# Patient Record
Sex: MALE | Race: WHITE | NOT HISPANIC OR LATINO | Employment: FULL TIME | URBAN - METROPOLITAN AREA
[De-identification: names, ages, dates, MRNs, and addresses within clinical notes are randomized per-mention and may not be internally consistent; named-entity substitution may affect disease eponyms.]

---

## 2018-01-08 ENCOUNTER — OFFICE VISIT (OUTPATIENT)
Dept: URGENT CARE | Facility: CLINIC | Age: 58
End: 2018-01-08
Payer: COMMERCIAL

## 2018-01-08 VITALS
BODY MASS INDEX: 31.39 KG/M2 | HEART RATE: 97 BPM | DIASTOLIC BLOOD PRESSURE: 82 MMHG | TEMPERATURE: 98 F | WEIGHT: 200 LBS | SYSTOLIC BLOOD PRESSURE: 141 MMHG | RESPIRATION RATE: 18 BRPM | OXYGEN SATURATION: 97 % | HEIGHT: 67 IN

## 2018-01-08 DIAGNOSIS — J06.9 VIRAL URI WITH COUGH: ICD-10-CM

## 2018-01-08 DIAGNOSIS — R50.9 FEVER AND CHILLS: Primary | ICD-10-CM

## 2018-01-08 LAB
CTP QC/QA: YES
FLUAV AG NPH QL: NEGATIVE
FLUBV AG NPH QL: NEGATIVE

## 2018-01-08 PROCEDURE — 99203 OFFICE O/P NEW LOW 30 MIN: CPT | Mod: S$GLB,,, | Performed by: NURSE PRACTITIONER

## 2018-01-08 PROCEDURE — 87804 INFLUENZA ASSAY W/OPTIC: CPT | Mod: QW,S$GLB,, | Performed by: NURSE PRACTITIONER

## 2018-01-08 RX ORDER — PROMETHAZINE HYDROCHLORIDE AND DEXTROMETHORPHAN HYDROBROMIDE 6.25; 15 MG/5ML; MG/5ML
5 SYRUP ORAL EVERY 4 HOURS PRN
Qty: 120 ML | Refills: 0 | Status: SHIPPED | OUTPATIENT
Start: 2018-01-08 | End: 2018-01-18

## 2018-01-08 RX ORDER — FLUTICASONE PROPIONATE 50 MCG
1 SPRAY, SUSPENSION (ML) NASAL DAILY
Qty: 1 BOTTLE | Refills: 0 | Status: SHIPPED | OUTPATIENT
Start: 2018-01-08 | End: 2018-08-28

## 2018-01-08 NOTE — PATIENT INSTRUCTIONS
Please take Mucinex with plenty of water.  Viral Upper Respiratory Illness (Adult)  You have a viral upper respiratory illness (URI), which is another term for the common cold. This illness is contagious during the first few days. It is spread through the air by coughing and sneezing. It may also be spread by direct contact (touching the sick person and then touching your own eyes, nose, or mouth). Frequent handwashing will decrease risk of spread. Most viral illnesses go away within 7 to 10 days with rest and simple home remedies. Sometimes the illness may last for several weeks. Antibiotics will not kill a virus, and they are generally not prescribed for this condition.    Home care  · If symptoms are severe, rest at home for the first 2 to 3 days. When you resume activity, don't let yourself get too tired.  · Avoid being exposed to cigarette smoke (yours or others).  · You may use acetaminophen or ibuprofen to control pain and fever, unless another medicine was prescribed. (Note: If you have chronic liver or kidney disease, have ever had a stomach ulcer or gastrointestinal bleeding, or are taking blood-thinning medicines, talk with your healthcare provider before using these medicines.) Aspirin should never be given to anyone under 18 years of age who is ill with a viral infection or fever. It may cause severe liver or brain damage.  · Your appetite may be poor, so a light diet is fine. Avoid dehydration by drinking 6 to 8 glasses of fluids per day (water, soft drinks, juices, tea, or soup). Extra fluids will help loosen secretions in the nose and lungs.  · Over-the-counter cold medicines will not shorten the length of time youre sick, but they may be helpful for the following symptoms: cough, sore throat, and nasal and sinus congestion. (Note: Do not use decongestants if you have high blood pressure.)  Follow-up care  Follow up with your healthcare provider, or as advised.  When to seek medical  advice  Call your healthcare provider right away if any of these occur:  · Cough with lots of colored sputum (mucus)  · Severe headache; face, neck, or ear pain  · Difficulty swallowing due to throat pain  · Fever of 100.4°F (38°C)  Call 911, or get immediate medical care  Call emergency services right away if any of these occur:  · Chest pain, shortness of breath, wheezing, or difficulty breathing  · Coughing up blood  · Inability to swallow due to throat pain  Date Last Reviewed: 9/13/2015 © 2000-2017 [employer]. [address] [place] [address]. All rights reserved. This information is not intended as a substitute for professional medical care. Always follow your healthcare professional's instructions.

## 2018-01-08 NOTE — PROGRESS NOTES
"Subjective:       Patient ID: Chi Alvarado Jr. is a 57 y.o. male.    Vitals:  height is 5' 7" (1.702 m) and weight is 90.7 kg (200 lb). His temperature is 98.1 °F (36.7 °C). His blood pressure is 141/82 (abnormal) and his pulse is 97. His respiration is 18 and oxygen saturation is 97%.     Chief Complaint: URI    States his symptoms started on Friday. Fever has been subjective. Taking Mucinex, AlkaSeltzer cold.      URI    This is a new problem. The current episode started in the past 7 days. The problem has been gradually worsening. There has been no fever. Associated symptoms include congestion, coughing, headaches and rhinorrhea. Pertinent negatives include no abdominal pain, chest pain, ear pain, nausea, rash, sinus pain, sneezing, sore throat or wheezing. Treatments tried: mucinex. The treatment provided no relief.     Review of Systems   Constitution: Positive for chills, fever and malaise/fatigue.   HENT: Positive for congestion and rhinorrhea. Negative for ear pain, hoarse voice, sinus pain, sneezing and sore throat.    Eyes: Negative for discharge and redness.   Cardiovascular: Negative for chest pain, dyspnea on exertion and leg swelling.   Respiratory: Positive for cough. Negative for shortness of breath, sputum production and wheezing.    Hematologic/Lymphatic: Negative for adenopathy.   Skin: Negative for color change and rash.   Musculoskeletal: Negative for myalgias.   Gastrointestinal: Negative for abdominal pain and nausea.   Neurological: Positive for headaches. Negative for dizziness and light-headedness.   All other systems reviewed and are negative.      Objective:      Physical Exam   Constitutional: He is oriented to person, place, and time. He appears well-developed and well-nourished.  Non-toxic appearance. He does not have a sickly appearance. He does not appear ill.   HENT:   Head: Normocephalic and atraumatic.   Right Ear: Hearing, external ear and ear canal normal. A middle ear " effusion is present.   Left Ear: Hearing, external ear and ear canal normal. A middle ear effusion is present.   Nose: Mucosal edema and rhinorrhea present. Right sinus exhibits no maxillary sinus tenderness and no frontal sinus tenderness. Left sinus exhibits no maxillary sinus tenderness and no frontal sinus tenderness.   Mouth/Throat: Uvula is midline and mucous membranes are normal. Oropharyngeal exudate and posterior oropharyngeal erythema present.   Eyes: Conjunctivae and EOM are normal. Pupils are equal, round, and reactive to light.   Neck: Normal range of motion. Neck supple. No spinous process tenderness and no muscular tenderness present. No neck rigidity. Normal range of motion present.   Cardiovascular: Normal rate, regular rhythm and normal heart sounds.    Pulmonary/Chest: Effort normal and breath sounds normal. He has no wheezes.   Lymphadenopathy:     He has no cervical adenopathy.   Neurological: He is alert and oriented to person, place, and time.   Skin: Skin is warm and dry. Capillary refill takes less than 2 seconds. No rash noted.   Psychiatric: He has a normal mood and affect.   Nursing note and vitals reviewed.      Assessment:       1. Fever and chills    2. Viral URI with cough        Plan:         Fever and chills  -     POCT Influenza A/B    Viral URI with cough  -     fluticasone (FLONASE) 50 mcg/actuation nasal spray; 1 spray (50 mcg total) by Each Nare route once daily.  Dispense: 1 Bottle; Refill: 0  -     promethazine-dextromethorphan (PROMETHAZINE-DM) 6.25-15 mg/5 mL Syrp; Take 5 mLs by mouth every 4 (four) hours as needed (cough). CAUTION: MAY CAUSE DROWSINESS  Dispense: 120 mL; Refill: 0      Patient Instructions       Please take Mucinex with plenty of water.  Viral Upper Respiratory Illness (Adult)  You have a viral upper respiratory illness (URI), which is another term for the common cold. This illness is contagious during the first few days. It is spread through the air by  coughing and sneezing. It may also be spread by direct contact (touching the sick person and then touching your own eyes, nose, or mouth). Frequent handwashing will decrease risk of spread. Most viral illnesses go away within 7 to 10 days with rest and simple home remedies. Sometimes the illness may last for several weeks. Antibiotics will not kill a virus, and they are generally not prescribed for this condition.    Home care  · If symptoms are severe, rest at home for the first 2 to 3 days. When you resume activity, don't let yourself get too tired.  · Avoid being exposed to cigarette smoke (yours or others).  · You may use acetaminophen or ibuprofen to control pain and fever, unless another medicine was prescribed. (Note: If you have chronic liver or kidney disease, have ever had a stomach ulcer or gastrointestinal bleeding, or are taking blood-thinning medicines, talk with your healthcare provider before using these medicines.) Aspirin should never be given to anyone under 18 years of age who is ill with a viral infection or fever. It may cause severe liver or brain damage.  · Your appetite may be poor, so a light diet is fine. Avoid dehydration by drinking 6 to 8 glasses of fluids per day (water, soft drinks, juices, tea, or soup). Extra fluids will help loosen secretions in the nose and lungs.  · Over-the-counter cold medicines will not shorten the length of time youre sick, but they may be helpful for the following symptoms: cough, sore throat, and nasal and sinus congestion. (Note: Do not use decongestants if you have high blood pressure.)  Follow-up care  Follow up with your healthcare provider, or as advised.  When to seek medical advice  Call your healthcare provider right away if any of these occur:  · Cough with lots of colored sputum (mucus)  · Severe headache; face, neck, or ear pain  · Difficulty swallowing due to throat pain  · Fever of 100.4°F (38°C)  Call 911, or get immediate medical care  Call  emergency services right away if any of these occur:  · Chest pain, shortness of breath, wheezing, or difficulty breathing  · Coughing up blood  · Inability to swallow due to throat pain  Date Last Reviewed: 9/13/2015  © 0948-8578 [employer]. [address] [place] [address], [place] [address]. All rights reserved. This information is not intended as a substitute for professional medical care. Always follow your healthcare professional's instructions.

## 2018-08-28 ENCOUNTER — OFFICE VISIT (OUTPATIENT)
Dept: FAMILY MEDICINE | Facility: CLINIC | Age: 58
End: 2018-08-28
Payer: COMMERCIAL

## 2018-08-28 ENCOUNTER — DOCUMENTATION ONLY (OUTPATIENT)
Dept: FAMILY MEDICINE | Facility: CLINIC | Age: 58
End: 2018-08-28

## 2018-08-28 VITALS
TEMPERATURE: 98 F | OXYGEN SATURATION: 96 % | BODY MASS INDEX: 29.98 KG/M2 | DIASTOLIC BLOOD PRESSURE: 68 MMHG | WEIGHT: 191 LBS | SYSTOLIC BLOOD PRESSURE: 130 MMHG | HEIGHT: 67 IN | HEART RATE: 86 BPM

## 2018-08-28 DIAGNOSIS — G47.9 SLEEP DISTURBANCE: ICD-10-CM

## 2018-08-28 DIAGNOSIS — F17.200 TOBACCO DEPENDENCE: ICD-10-CM

## 2018-08-28 DIAGNOSIS — Z78.9 ALCOHOL CONSUMPTION HEAVY: Chronic | ICD-10-CM

## 2018-08-28 DIAGNOSIS — Z00.00 ROUTINE MEDICAL EXAM: Primary | ICD-10-CM

## 2018-08-28 DIAGNOSIS — I83.93 VARICOSE VEINS OF BOTH LOWER EXTREMITIES WITHOUT ULCER OR INFLAMMATION: ICD-10-CM

## 2018-08-28 DIAGNOSIS — Z12.11 ENCOUNTER FOR FIT (FECAL IMMUNOCHEMICAL TEST) SCREENING: ICD-10-CM

## 2018-08-28 DIAGNOSIS — B18.2 CHRONIC HEPATITIS C WITHOUT HEPATIC COMA: ICD-10-CM

## 2018-08-28 DIAGNOSIS — R53.83 FATIGUE, UNSPECIFIED TYPE: ICD-10-CM

## 2018-08-28 DIAGNOSIS — Z23 NEED FOR DIPHTHERIA, TETANUS, PERTUSSIS, AND HIB VACCINATION: ICD-10-CM

## 2018-08-28 PROCEDURE — 90471 IMMUNIZATION ADMIN: CPT | Mod: S$GLB,,, | Performed by: INTERNAL MEDICINE

## 2018-08-28 PROCEDURE — 90715 TDAP VACCINE 7 YRS/> IM: CPT | Mod: S$GLB,,, | Performed by: INTERNAL MEDICINE

## 2018-08-28 PROCEDURE — 99386 PREV VISIT NEW AGE 40-64: CPT | Mod: 25,S$GLB,, | Performed by: INTERNAL MEDICINE

## 2018-08-28 PROCEDURE — 99999 PR PBB SHADOW E&M-EST. PATIENT-LVL III: CPT | Mod: PBBFAC,,, | Performed by: INTERNAL MEDICINE

## 2018-08-28 NOTE — PROGRESS NOTES
Assessment & Plan  Problem List Items Addressed This Visit        Psychiatric    Alcohol consumption heavy (Chronic)    Overview     Reduced to only on weekends         Current Assessment & Plan     Counseled on reducing intake further.             Cardiac/Vascular    Varicose veins of both lower extremities without ulcer or inflammation    Current Assessment & Plan     Asymptomatic.  Counseled on compression, sodium restriction, elevation.              GI    Hepatitis C without hepatic coma (Chronic)    Current Assessment & Plan     Recheck labs.  Referred back to Hep C clinic         Relevant Orders    AFP tumor marker    Hepatitis C RNA, quantitative, PCR    AMB REFERRAL TO HEPATITIS C       Other    Sleep disturbance    Current Assessment & Plan     Some restless leg symptoms.  Checking labs today.          Tobacco dependence (Chronic)    Current Assessment & Plan     I counseled the patient on smoking cessation, risks associated with smoking, having a quit date, nicotine replacement, medications that can aid in cessation, and having a plan for future cravings.  The patient stated that he is not currently ready to quit.  Seems pre-contemlplative             Other Visit Diagnoses     Routine medical exam    -  Primary  -    Discussed healthy diet, regular exercise, necessary labs, age appropriate cancer screening, and routine vaccinations.       Relevant Orders    CBC auto differential    Comprehensive metabolic panel    Lipid panel    TSH    Hemoglobin A1c    Need for diphtheria, tetanus, pertussis, and Hib vaccination    -  vaccinate    Relevant Orders    Tdap Vaccine Greater than or Eqaul to 7 y.o. (Completed)    Fatigue, unspecified type    -  Check labs    Relevant Orders    Ferritin    Iron and TIBC    Vitamin B12    Encounter for FIT (fecal immunochemical test) screening    -  Check Fit Kit    Relevant Orders    Fecal Immunochemical Test (iFOBT)            Health Maintenance reviewed, as  above.    Follow-up: Follow-up for pending labs.    ______________________________________________________________________    Chief Complaint  Chief Complaint   Patient presents with    Establish Care     new to pcp       LEENA  Chi Alvarado Jr. is a 58 y.o. male with multiple medical diagnoses as listed in the medical history and problem list that presents for establish care.  Pt is known to me with his last appointment 01/2015.  The patient was previously followed by one of my partners that has since retired .  I have reviewed their most recent previous OV with their previous PCP, most recent labs and most recent imaging studies and interpreted them myself. He has not been seen by any other primary care since that time.      He has some concerns about increase in the size of some veins in his RLE.  Has some purple discoloration at the ankle.  Mild tenderness.  No swelling.  Also c/o sharp pain of his right lower ribs anteriorly.  Started after he was working on staining a [employer] table.  He is LHD so not lots of repetitive motion with RUE.  No clear injury.  It was a constant all day pain.  It hurts more with a deep inspiration.  It is improved some today.  He took some aleve which seemed to help.    He has cut his EtOH consumption down.  He no longer drinks on the weekdays.  He is only drinking on the weekends.  He drinks 12 beers over the course of the weekend.         PAST MEDICAL HISTORY:  Past Medical History:   Diagnosis Date    Alcohol consumption heavy 10/6/2014    C1 cervical fracture     Chronic hepatitis C without mention of hepatic coma     treatment naive, genotype 2B    Mandible fracture        PAST SURGICAL HISTORY:  Past Surgical History:   Procedure Laterality Date    MANDIBLE FRACTURE SURGERY         SOCIAL HISTORY:  Social History     Socioeconomic History    Marital status:      Spouse name: Not on file    Number of children: Not on file    Years of education: Not on file     Highest education level: Not on file   Social Needs    Financial resource strain: Not on file    Food insecurity - worry: Not on file    Food insecurity - inability: Not on file    Transportation needs - medical: Not on file    Transportation needs - non-medical: Not on file   Occupational History     Employer: [employer]   Tobacco Use    Smoking status: Current Every Day Smoker     Packs/day: 0.50     Years: 41.00     Pack years: 20.50    Smokeless tobacco: Never Used   Substance and Sexual Activity    Alcohol use: Yes     Comment: weekends 6 beers a day    Drug use: No    Sexual activity: Yes     Partners: Female     Comment:    Other Topics Concern    Not on file   Social History Narrative    Not on file       FAMILY HISTORY:  Family History   Problem Relation Age of Onset    Breast cancer Mother     Lung cancer Mother     Heart disease Father     Heart attack Father        ALLERGIES AND MEDICATIONS: updated and reviewed.  Review of patient's allergies indicates:   Allergen Reactions    Codeine      No current outpatient medications on file.     No current facility-administered medications for this visit.          ROS  Review of Systems   Constitutional: Negative for chills, fatigue, fever and unexpected weight change.   HENT: Negative for congestion, dental problem, ear discharge, ear pain, hearing loss, postnasal drip, rhinorrhea, sinus pressure, sore throat and trouble swallowing.    Eyes: Negative for pain, discharge, redness, itching and visual disturbance.   Respiratory: Negative for cough, chest tightness, shortness of breath and wheezing.    Cardiovascular: Negative for chest pain, palpitations and leg swelling.        + rib pain   Gastrointestinal: Negative for abdominal distention, abdominal pain, blood in stool, constipation, diarrhea, nausea and vomiting.        No melena   Endocrine: Negative for cold intolerance, heat intolerance, polydipsia, polyphagia and  "polyuria.        No nocturia   Genitourinary: Negative for decreased urine volume, difficulty urinating, discharge, dysuria, flank pain, frequency, hematuria, penile pain, penile swelling, scrotal swelling, testicular pain and urgency.   Musculoskeletal: Negative for arthralgias, back pain, gait problem, joint swelling, myalgias, neck pain and neck stiffness.   Skin: Negative for color change, pallor and rash.   Neurological: Negative for dizziness, tremors, seizures, syncope, weakness, light-headedness and headaches.   Hematological: Negative for adenopathy. Does not bruise/bleed easily.   Psychiatric/Behavioral: Negative for confusion, decreased concentration, sleep disturbance and suicidal ideas. The patient is not nervous/anxious.         No depression           Physical Exam  Vitals:    08/28/18 0851   BP: 130/68   Pulse: 86   Temp: 98.4 °F (36.9 °C)   SpO2: 96%   Weight: 86.6 kg (191 lb)   Height: 5' 7" (1.702 m)    Body mass index is 29.91 kg/m².  Weight: 86.6 kg (191 lb)   Height: 5' 7" (170.2 cm)   Physical Exam   Constitutional: He is oriented to person, place, and time. He appears well-developed and well-nourished. No distress.   HENT:   Head: Normocephalic and atraumatic.   Right Ear: Tympanic membrane, external ear and ear canal normal.   Left Ear: Tympanic membrane, external ear and ear canal normal.   Nose: Nose normal. No septal deviation. Right sinus exhibits no maxillary sinus tenderness and no frontal sinus tenderness. Left sinus exhibits no maxillary sinus tenderness and no frontal sinus tenderness.   Mouth/Throat: Uvula is midline, oropharynx is clear and moist and mucous membranes are normal. No tonsillar exudate.   Eyes: Conjunctivae and EOM are normal. Pupils are equal, round, and reactive to light. Right eye exhibits no discharge. Left eye exhibits no discharge. No scleral icterus.   Neck: Neck supple. No JVD present. No spinous process tenderness and no muscular tenderness present. Carotid " bruit is not present. No tracheal deviation present. No thyroid mass and no thyromegaly present.   Cardiovascular: Normal rate, regular rhythm, normal heart sounds and intact distal pulses. Exam reveals no S3, no S4 and no friction rub.   No murmur heard.  Pulmonary/Chest: Effort normal and breath sounds normal. He has no wheezes. He has no rhonchi. He has no rales.   Abdominal: Soft. Bowel sounds are normal. He exhibits no distension. There is no tenderness. There is no rebound, no guarding and no CVA tenderness.   Musculoskeletal: Normal range of motion. He exhibits no edema or tenderness.   Lymphadenopathy:        Head (right side): No submental and no submandibular adenopathy present.        Head (left side): No submental and no submandibular adenopathy present.     He has no cervical adenopathy.   Neurological: He is alert and oriented to person, place, and time. Coordination normal.   Motor grossly intact.  Sensation grossly normal.  Symmetric facial movements palate elevated symmetrically tongue midline    Skin: Skin is warm and dry. Capillary refill takes less than 2 seconds. No rash noted. No cyanosis. Nails show no clubbing.   Psychiatric: He has a normal mood and affect. His speech is normal and behavior is normal. Thought content normal. Cognition and memory are normal.         Health Maintenance       Date Due Completion Date    TETANUS VACCINE 07/05/1979 ---    Pneumococcal PPSV23 (Medium Risk) (1) 07/05/1979 ---    Colonoscopy 07/05/2011 ---    Influenza Vaccine 08/01/2018 ---    Lipid Panel 08/19/2019 8/19/2014

## 2018-08-28 NOTE — ASSESSMENT & PLAN NOTE
I counseled the patient on smoking cessation, risks associated with smoking, having a quit date, nicotine replacement, medications that can aid in cessation, and having a plan for future cravings.  The patient stated that he is not currently ready to quit.  Seems pre-contemlplative

## 2018-08-29 ENCOUNTER — LAB VISIT (OUTPATIENT)
Dept: LAB | Facility: HOSPITAL | Age: 58
End: 2018-08-29
Attending: INTERNAL MEDICINE
Payer: COMMERCIAL

## 2018-08-29 DIAGNOSIS — R53.83 FATIGUE, UNSPECIFIED TYPE: ICD-10-CM

## 2018-08-29 DIAGNOSIS — Z00.00 ROUTINE MEDICAL EXAM: ICD-10-CM

## 2018-08-29 DIAGNOSIS — B18.2 CHRONIC HEPATITIS C WITHOUT HEPATIC COMA: ICD-10-CM

## 2018-08-29 LAB
AFP SERPL-MCNC: 3.8 NG/ML
ALBUMIN SERPL BCP-MCNC: 3.9 G/DL
ALP SERPL-CCNC: 42 U/L
ALT SERPL W/O P-5'-P-CCNC: 42 U/L
ANION GAP SERPL CALC-SCNC: 7 MMOL/L
AST SERPL-CCNC: 29 U/L
BASOPHILS # BLD AUTO: 0.1 K/UL
BASOPHILS NFR BLD: 1.1 %
BILIRUB SERPL-MCNC: 0.5 MG/DL
BUN SERPL-MCNC: 11 MG/DL
CALCIUM SERPL-MCNC: 9.6 MG/DL
CHLORIDE SERPL-SCNC: 104 MMOL/L
CHOLEST SERPL-MCNC: 184 MG/DL
CHOLEST/HDLC SERPL: 5.4 {RATIO}
CO2 SERPL-SCNC: 26 MMOL/L
CREAT SERPL-MCNC: 0.8 MG/DL
DIFFERENTIAL METHOD: ABNORMAL
EOSINOPHIL # BLD AUTO: 0.4 K/UL
EOSINOPHIL NFR BLD: 4.1 %
ERYTHROCYTE [DISTWIDTH] IN BLOOD BY AUTOMATED COUNT: 12.6 %
EST. GFR  (AFRICAN AMERICAN): >60 ML/MIN/1.73 M^2
EST. GFR  (NON AFRICAN AMERICAN): >60 ML/MIN/1.73 M^2
ESTIMATED AVG GLUCOSE: 114 MG/DL
FERRITIN SERPL-MCNC: 319 NG/ML
GLUCOSE SERPL-MCNC: 102 MG/DL
HBA1C MFR BLD HPLC: 5.6 %
HCT VFR BLD AUTO: 45.6 %
HDLC SERPL-MCNC: 34 MG/DL
HDLC SERPL: 18.5 %
HGB BLD-MCNC: 15.2 G/DL
IMM GRANULOCYTES # BLD AUTO: 0.04 K/UL
IMM GRANULOCYTES NFR BLD AUTO: 0.4 %
IRON SERPL-MCNC: 118 UG/DL
LDLC SERPL CALC-MCNC: 130.2 MG/DL
LYMPHOCYTES # BLD AUTO: 2 K/UL
LYMPHOCYTES NFR BLD: 21.8 %
MCH RBC QN AUTO: 32.2 PG
MCHC RBC AUTO-ENTMCNC: 33.3 G/DL
MCV RBC AUTO: 97 FL
MONOCYTES # BLD AUTO: 0.9 K/UL
MONOCYTES NFR BLD: 9.2 %
NEUTROPHILS # BLD AUTO: 5.9 K/UL
NEUTROPHILS NFR BLD: 63.4 %
NONHDLC SERPL-MCNC: 150 MG/DL
NRBC BLD-RTO: 0 /100 WBC
PLATELET # BLD AUTO: 332 K/UL
PMV BLD AUTO: 10.2 FL
POTASSIUM SERPL-SCNC: 4.5 MMOL/L
PROT SERPL-MCNC: 7.4 G/DL
RBC # BLD AUTO: 4.72 M/UL
SATURATED IRON: 30 %
SODIUM SERPL-SCNC: 137 MMOL/L
TOTAL IRON BINDING CAPACITY: 397 UG/DL
TRANSFERRIN SERPL-MCNC: 268 MG/DL
TRIGL SERPL-MCNC: 99 MG/DL
TSH SERPL DL<=0.005 MIU/L-ACNC: 0.93 UIU/ML
VIT B12 SERPL-MCNC: 747 PG/ML
WBC # BLD AUTO: 9.36 K/UL

## 2018-08-29 PROCEDURE — 82607 VITAMIN B-12: CPT

## 2018-08-29 PROCEDURE — 80053 COMPREHEN METABOLIC PANEL: CPT

## 2018-08-29 PROCEDURE — 82105 ALPHA-FETOPROTEIN SERUM: CPT

## 2018-08-29 PROCEDURE — 80061 LIPID PANEL: CPT

## 2018-08-29 PROCEDURE — 83036 HEMOGLOBIN GLYCOSYLATED A1C: CPT

## 2018-08-29 PROCEDURE — 82728 ASSAY OF FERRITIN: CPT

## 2018-08-29 PROCEDURE — 87522 HEPATITIS C REVRS TRNSCRPJ: CPT

## 2018-08-29 PROCEDURE — 83540 ASSAY OF IRON: CPT

## 2018-08-29 PROCEDURE — 84443 ASSAY THYROID STIM HORMONE: CPT

## 2018-08-29 PROCEDURE — 85025 COMPLETE CBC W/AUTO DIFF WBC: CPT

## 2018-08-31 ENCOUNTER — DOCUMENTATION ONLY (OUTPATIENT)
Dept: TRANSPLANT | Facility: CLINIC | Age: 58
End: 2018-08-31

## 2018-08-31 ENCOUNTER — LAB VISIT (OUTPATIENT)
Dept: LAB | Facility: HOSPITAL | Age: 58
End: 2018-08-31
Attending: INTERNAL MEDICINE
Payer: COMMERCIAL

## 2018-08-31 ENCOUNTER — PATIENT MESSAGE (OUTPATIENT)
Dept: FAMILY MEDICINE | Facility: CLINIC | Age: 58
End: 2018-08-31

## 2018-08-31 DIAGNOSIS — E78.2 MIXED HYPERLIPIDEMIA: ICD-10-CM

## 2018-08-31 DIAGNOSIS — Z12.11 ENCOUNTER FOR FIT (FECAL IMMUNOCHEMICAL TEST) SCREENING: ICD-10-CM

## 2018-08-31 LAB
HCV LOG: 6.46 LOG (10) IU/ML
HCV RNA QUANT PCR: ABNORMAL IU/ML
HCV, QUALITATIVE: DETECTED IU/ML
HEMOCCULT STL QL IA: NEGATIVE

## 2018-08-31 PROCEDURE — 82274 ASSAY TEST FOR BLOOD FECAL: CPT

## 2018-08-31 RX ORDER — PRAVASTATIN SODIUM 20 MG/1
20 TABLET ORAL DAILY
Qty: 90 TABLET | Refills: 1 | Status: SHIPPED | OUTPATIENT
Start: 2018-08-31 | End: 2019-05-04 | Stop reason: SDUPTHER

## 2018-08-31 NOTE — TELEPHONE ENCOUNTER
The 10-year ASCVD risk score (Yolande MAN Jr., et al., 2013) is: 14.8%    Values used to calculate the score:      Age: 58 years      Sex: Male      Is Non- : No      Diabetic: No      Tobacco smoker: Yes      Systolic Blood Pressure: 130 mmHg      Is BP treated: No      HDL Cholesterol: 34 mg/dL      Total Cholesterol: 184 mg/dL

## 2018-08-31 NOTE — LETTER
August 31, 2018    Chi Alvarado  [address]  [place] [address]      Dear Chi Alvarado:    Your doctor has referred you to the [facility]. You will be contacted by our office and an initial appointment will then be scheduled for you.    We look forward to seeing you soon. If you have any further questions, please contact us at [phone].       Sincerely,        [facility]   [address] [place] [id number]  [phone]

## 2018-08-31 NOTE — PROGRESS NOTES
Your stool results are normal.  Please continue your current medications and doses.  Please feel free to contact me with any questions or concerns.    Sincerely,  Delio Cruz  [email or web address]

## 2018-08-31 NOTE — NURSING
Pt records reviewed.   Pt will be referred to Hepatitis C clinic    Initial referral received  from the [facility].   Referring Provider/diagnosis  DELIO CRUZ Provider: Delio Cruz MD   Diagnosis: Chronic hepatitis C without hepatic coma      Seen by [place] in past       Referral letter sent to  patient.

## 2018-09-14 ENCOUNTER — INITIAL CONSULT (OUTPATIENT)
Dept: HEPATOLOGY | Facility: CLINIC | Age: 58
End: 2018-09-14
Payer: COMMERCIAL

## 2018-09-14 ENCOUNTER — LAB VISIT (OUTPATIENT)
Dept: LAB | Facility: HOSPITAL | Age: 58
End: 2018-09-14
Payer: COMMERCIAL

## 2018-09-14 VITALS
SYSTOLIC BLOOD PRESSURE: 147 MMHG | WEIGHT: 193.31 LBS | TEMPERATURE: 98 F | OXYGEN SATURATION: 97 % | HEART RATE: 99 BPM | DIASTOLIC BLOOD PRESSURE: 84 MMHG | RESPIRATION RATE: 18 BRPM | BODY MASS INDEX: 30.34 KG/M2 | HEIGHT: 67 IN

## 2018-09-14 DIAGNOSIS — B18.2 CHRONIC HEPATITIS C WITHOUT HEPATIC COMA: ICD-10-CM

## 2018-09-14 DIAGNOSIS — B18.2 CHRONIC HEPATITIS C WITHOUT HEPATIC COMA: Primary | ICD-10-CM

## 2018-09-14 LAB — HIV 1+2 AB+HIV1 P24 AG SERPL QL IA: NEGATIVE

## 2018-09-14 PROCEDURE — 99999 PR PBB SHADOW E&M-EST. PATIENT-LVL IV: CPT | Mod: PBBFAC,,, | Performed by: PHYSICIAN ASSISTANT

## 2018-09-14 PROCEDURE — 99214 OFFICE O/P EST MOD 30 MIN: CPT | Mod: S$GLB,,, | Performed by: PHYSICIAN ASSISTANT

## 2018-09-14 PROCEDURE — 3008F BODY MASS INDEX DOCD: CPT | Mod: CPTII,S$GLB,, | Performed by: PHYSICIAN ASSISTANT

## 2018-09-14 PROCEDURE — 36415 COLL VENOUS BLD VENIPUNCTURE: CPT

## 2018-09-14 PROCEDURE — 86703 HIV-1/HIV-2 1 RESULT ANTBDY: CPT
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
## 2018-09-14 NOTE — LETTER
September 14, 2018      Delio Cruz MD  [address]  [place] [address]           [facility] - Hepatitis C  [address]  [facility] [address]  Phone: [phone]  Fax: [phone]          Patient: Chi Alvarado Jr.   MR Number: [id number]   YOB: 1960   Date of Visit: 9/14/2018       Dear Dr. Delio Cruz:    Thank you for referring Chi Alvarado to me for evaluation. Attached you will find relevant portions of my assessment and plan of care.    If you have questions, please do not hesitate to call me. I look forward to following Chi Alvarado along with you.    Sincerely,    Jennifer B. Scheuermann, PA    Enclosure  CC:  No Recipients    If you would like to receive this communication electronically, please contact [email or web address] or [phone] to request more information on [employer] access.    For providers and/or their staff who would like to refer a patient to [facility], please contact us through our one-stop-shop provider referral line, [facility], at [phone].    If you feel you have received this communication in error or would no longer like to receive these types of communications, please e-mail [email or web address]

## 2018-10-09 ENCOUNTER — TELEPHONE (OUTPATIENT)
Dept: HEPATOLOGY | Facility: CLINIC | Age: 58
End: 2018-10-09

## 2018-10-09 NOTE — TELEPHONE ENCOUNTER
Attempt made to reach patient to see if we could reschedule appts since fibroscan will not be approved on Friday unsuccessful.  Unable to LVM.

## 2018-10-10 NOTE — TELEPHONE ENCOUNTER
I spoke patient and informed him that fibroscan not approved for visit on Friday.  He asked that all appts scheduled be moved to another day.  Appts moved to 10/29/18; reminder notice mailed.

## 2018-10-29 ENCOUNTER — PROCEDURE VISIT (OUTPATIENT)
Dept: HEPATOLOGY | Facility: CLINIC | Age: 58
End: 2018-10-29
Attending: PHYSICIAN ASSISTANT
Payer: COMMERCIAL

## 2018-10-29 ENCOUNTER — OFFICE VISIT (OUTPATIENT)
Dept: HEPATOLOGY | Facility: CLINIC | Age: 58
End: 2018-10-29
Payer: COMMERCIAL

## 2018-10-29 ENCOUNTER — TELEPHONE (OUTPATIENT)
Dept: PHARMACY | Facility: CLINIC | Age: 58
End: 2018-10-29

## 2018-10-29 ENCOUNTER — HOSPITAL ENCOUNTER (OUTPATIENT)
Dept: RADIOLOGY | Facility: HOSPITAL | Age: 58
Discharge: HOME | End: 2018-10-29
Attending: PHYSICIAN ASSISTANT
Payer: COMMERCIAL

## 2018-10-29 VITALS
BODY MASS INDEX: 30.45 KG/M2 | HEIGHT: 67 IN | OXYGEN SATURATION: 100 % | SYSTOLIC BLOOD PRESSURE: 130 MMHG | TEMPERATURE: 99 F | WEIGHT: 194 LBS | DIASTOLIC BLOOD PRESSURE: 85 MMHG | HEART RATE: 70 BPM | RESPIRATION RATE: 18 BRPM

## 2018-10-29 DIAGNOSIS — B18.2 CHRONIC HEPATITIS C WITHOUT HEPATIC COMA: ICD-10-CM

## 2018-10-29 DIAGNOSIS — K76.0 FATTY LIVER: ICD-10-CM

## 2018-10-29 DIAGNOSIS — B18.2 CHRONIC HEPATITIS C WITHOUT HEPATIC COMA: Primary | ICD-10-CM

## 2018-10-29 PROCEDURE — 76700 US EXAM ABDOM COMPLETE: CPT | Mod: TC

## 2018-10-29 PROCEDURE — 76700 US EXAM ABDOM COMPLETE: CPT | Mod: 26,,, | Performed by: INTERNAL MEDICINE

## 2018-10-29 PROCEDURE — 99214 OFFICE O/P EST MOD 30 MIN: CPT | Mod: S$GLB,,, | Performed by: PHYSICIAN ASSISTANT

## 2018-10-29 PROCEDURE — 99999 PR PBB SHADOW E&M-EST. PATIENT-LVL III: CPT | Mod: PBBFAC,,, | Performed by: PHYSICIAN ASSISTANT

## 2018-10-29 PROCEDURE — 3008F BODY MASS INDEX DOCD: CPT | Mod: CPTII,S$GLB,, | Performed by: PHYSICIAN ASSISTANT

## 2018-10-29 PROCEDURE — 91200 LIVER ELASTOGRAPHY: CPT | Mod: S$GLB,,, | Performed by: PHYSICIAN ASSISTANT

## 2018-10-29 RX ORDER — VELPATASVIR AND SOFOSBUVIR 100; 400 MG/1; MG/1
1 TABLET, FILM COATED ORAL DAILY
Qty: 28 TABLET | Refills: 2 | Status: SHIPPED | OUTPATIENT
Start: 2018-10-29 | End: 2019-01-31
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
## 2018-10-29 NOTE — PROCEDURES
Procedures     Fibroscan Procedure     Name: Chi Alvarado .  Date of Procedure : 10/29/2018   :: Jennifer B Scheuermann, PA  Diagnosis: HCV    Probe: M    Fibroscan readin.3 KPa    Fibrosis:F2     CAP readin dB/m    Steatosis: :S3

## 2018-10-30 NOTE — TELEPHONE ENCOUNTER
DOCUMENTATION ONLY:  Prior authorization for Epclusa approved from 10/30/2018 to 12/22/2019 x 12 weeks of treatment.   Case ID# [id number]    Co-pay: $5.00    Patient Assistance IS NOT required.     Forward to clinical pharmacist for consult & shipment.       Epclusa co-pay coupon card information:   BIN: [id number]  RxPCN: [employer]  Issuer: ([id number]  Group Number: [id number]  ID#: [id number]

## 2018-11-01 ENCOUNTER — TELEPHONE (OUTPATIENT)
Dept: PHARMACY | Facility: CLINIC | Age: 58
End: 2018-11-01

## 2018-11-01 NOTE — TELEPHONE ENCOUNTER
Initial Epclusa consult completed on . Epclusa will be picked up by patient on . Patient will start Epclusa on . Address confirmed, CC on file. Confirmed 2 patient identifiers - name and . Therapy Appropriate.     Epclusa 400/100mg- Take one tablet by mouth daily x 12 weeks  Counseling was reviewed:   1. Patient MUST take Epclusa at the SAME time every day.   2. Patient MUST avoid acid reducers without consulting with myself or provider first. Antacids are to be spaced out at least 4 hours apart from Epclusa.      3. Potential Side effects include: nausea, headaches, insomnia and fatigue.   Headache: Patient may treat with OTC remedies. If Tylenol is used, dose should not exceed 2000mg per day.    4. Medication list reviewed. No DDIs or allergies noted. Patient MUST contact myself or provider prior to starting any new OTC, herbal, or prescription drugs to avoid potential DDIs.    DDI: Medication list reviewed and potential DDIs addressed.    Discussed the importance of staying well hydrated while on therapy. Compliance stressed - patient to take missed doses as soon as remembered, but NOT to take 2 doses in one day. Patient will report questions or concerns to myself or practitioner. Patient verbalizes understanding. Patient plans to start Epclusa on .  Consultation included: indication; goals of treatment; administration; storage and handling; side effects; how to handle side effects; the importance of compliance; how to handle missed doses; the importance of laboratory monitoring; the importance of keeping all follow up appointments.  Patient understands to report any medication changes to [facility] and provider. All questions answered and addressed to patients satisfaction. I will f/u with her in 1 week from start, [facility] to contact patient in 3 weeks for refills.     Jorge L Almaraz, SHAMAR.Ph., AA[facility]  Clinical Pharmacist, HIV/HCV  [facility]  Phone: [phone]

## 2018-11-05 ENCOUNTER — EPISODE CHANGES (OUTPATIENT)
Dept: HEPATOLOGY | Facility: CLINIC | Age: 58
End: 2018-11-05

## 2018-11-05 ENCOUNTER — TELEPHONE (OUTPATIENT)
Dept: HEPATOLOGY | Facility: CLINIC | Age: 58
End: 2018-11-05

## 2018-11-05 DIAGNOSIS — B18.2 CHRONIC HEPATITIS C WITHOUT HEPATIC COMA: Primary | ICD-10-CM

## 2018-11-05 DIAGNOSIS — R74.8 ABNORMAL TRANSAMINASES: ICD-10-CM

## 2018-11-05 NOTE — TELEPHONE ENCOUNTER
Pt beginning 12 weeks of Epclusa on 11/6/18  Leslie 2b, tx naive  F2        Pls schedule:  - CMP, HCV RNA, HBV DNA, Ceruloplasmin at week 6 - 12/17  - CMP, HCV RNA, HBV DNA at week 12 - end of treatment - 1/28/19  - CMP, HCV RNA, HBV DNA - SVR12 - 4/22/19

## 2018-11-05 NOTE — TELEPHONE ENCOUNTER
Upon  of Epclusa on 11/5/18, patient questions if there are any DDIs with his current daily medication for cholesterol - Pravastatin; advised him that there is no DDIs. He is to continue his Pravastatin as usual and add the Epclusa to his daily regimen. He plans to start his first dose this evening. He was advised to call [facility] should he need to take any acid reducing medications, so that he can be advised properly. He also questions the use of Aleve; it was advised for him to use Tylenol for his sciatica pain, not to exceed 2000mg in 24 hours. Patient verbalized understanding and he will call [facility] if any other questions arise. ANTONIETTA

## 2018-11-13 ENCOUNTER — TELEPHONE (OUTPATIENT)
Dept: PHARMACY | Facility: CLINIC | Age: 58
End: 2018-11-13

## 2018-11-13 NOTE — TELEPHONE ENCOUNTER
Initial clinical follow-up conducted for Epclusa.  Name/ confirmed. no missed doses; no new medications.  Patient reports body aches starting about 5 days ago.  Patient also experienced some cold like symptoms.  Patient may have the flu.  Patient currently taking Mucinex and Tylenol to help with body aches.  Patient states he has trouble sleeping and joint pain wakes him up in the middle of the night.  Advised patient to try tylenol 1000 mg BID for joint pains.  Patient understands to report any medication changes to [facility] and provider.  All questions answered and addressed to patients satisfaction.

## 2018-11-26 ENCOUNTER — TELEPHONE (OUTPATIENT)
Dept: PHARMACY | Facility: CLINIC | Age: 58
End: 2018-11-26

## 2018-11-26 NOTE — TELEPHONE ENCOUNTER
Epclusa (2 of 3)  refill confirmed.  Epclusa to be picked up on  from [facility].  $0.00 copay- 004. Confirmed 2 patient identifiers - name and .     Patient has 7 doses of Epclusa remaining and takes it around 6 pm daily.  Pt reports some joint pain, but no other issues.  No missed doses, no new medications, no new allergies or health conditions reported at this time.  Patient reports taking one dose late; advised patient to set alarm as reminder to take medication.  All questions answered and addressed to patients satisfaction.  Pt verbalized understanding.

## 2018-12-17 ENCOUNTER — LAB VISIT (OUTPATIENT)
Dept: LAB | Facility: HOSPITAL | Age: 58
End: 2018-12-17
Attending: PHYSICIAN ASSISTANT
Payer: COMMERCIAL

## 2018-12-17 ENCOUNTER — EPISODE CHANGES (OUTPATIENT)
Dept: HEPATOLOGY | Facility: CLINIC | Age: 58
End: 2018-12-17

## 2018-12-17 DIAGNOSIS — R74.8 ABNORMAL TRANSAMINASES: ICD-10-CM

## 2018-12-17 DIAGNOSIS — B18.2 CHRONIC HEPATITIS C WITHOUT HEPATIC COMA: ICD-10-CM

## 2018-12-17 LAB
ALBUMIN SERPL BCP-MCNC: 4.1 G/DL
ALP SERPL-CCNC: 43 U/L
ALT SERPL W/O P-5'-P-CCNC: 32 U/L
ANION GAP SERPL CALC-SCNC: 10 MMOL/L
AST SERPL-CCNC: 24 U/L
BILIRUB SERPL-MCNC: 0.5 MG/DL
BUN SERPL-MCNC: 10 MG/DL
CALCIUM SERPL-MCNC: 9.6 MG/DL
CHLORIDE SERPL-SCNC: 100 MMOL/L
CO2 SERPL-SCNC: 28 MMOL/L
CREAT SERPL-MCNC: 0.7 MG/DL
EST. GFR  (AFRICAN AMERICAN): >60 ML/MIN/1.73 M^2
EST. GFR  (NON AFRICAN AMERICAN): >60 ML/MIN/1.73 M^2
GLUCOSE SERPL-MCNC: 96 MG/DL
POTASSIUM SERPL-SCNC: 4.2 MMOL/L
PROT SERPL-MCNC: 7.9 G/DL
SODIUM SERPL-SCNC: 138 MMOL/L

## 2018-12-17 PROCEDURE — 36415 COLL VENOUS BLD VENIPUNCTURE: CPT | Mod: PO

## 2018-12-17 PROCEDURE — 87522 HEPATITIS C REVRS TRNSCRPJ: CPT

## 2018-12-17 PROCEDURE — 87517 HEPATITIS B DNA QUANT: CPT

## 2018-12-17 PROCEDURE — 80053 COMPREHEN METABOLIC PANEL: CPT

## 2018-12-18 ENCOUNTER — TELEPHONE (OUTPATIENT)
Dept: HEPATOLOGY | Facility: CLINIC | Age: 58
End: 2018-12-18

## 2018-12-18 NOTE — TELEPHONE ENCOUNTER
----- Message from Gisel Naranjo RN sent at 12/18/2018 10:47 AM CST -----  Patient phoned stating that he has been experiencing lots of muscles aches since starting hep c tx and he's finding it hard to sleep at night.  He wanted a med prescribed to help him sleep.  He states that he will try Tylenol PM OTC for symptom and will call us back if warranted.

## 2018-12-19 ENCOUNTER — TELEPHONE (OUTPATIENT)
Dept: HEPATOLOGY | Facility: CLINIC | Age: 58
End: 2018-12-19

## 2018-12-19 ENCOUNTER — PATIENT MESSAGE (OUTPATIENT)
Dept: HEPATOLOGY | Facility: CLINIC | Age: 58
End: 2018-12-19

## 2018-12-19 LAB
HBV DNA SERPL NAA+PROBE-ACNC: <10 IU/ML
HBV DNA SERPL NAA+PROBE-LOG IU: <1 LOG (10) IU/ML
HBV DNA SERPL QL NAA+PROBE: NOT DETECTED
HCV RNA SERPL NAA+PROBE-LOG IU: <1.08 LOG (10) IU/ML
HCV RNA SERPL QL NAA+PROBE: NOT DETECTED IU/ML
HCV RNA SPEC NAA+PROBE-ACNC: <12 IU/ML

## 2018-12-19 NOTE — TELEPHONE ENCOUNTER
HCV LAB REVIEW  Week 6 of Epclusa, planning on 12 weeks treatment  Leslie 2b, tx naive  F2    Pertinent labs:  12/17  CMP stable  HBV neg  HCV neg      [facility] message sent to pt:  Labs reviewed  - Continue Epclusa - 1 pill daily - don't miss any doses.      Next labs due   - CMP, HCV RNA, HBV DNA at week 12 - end of treatment - 1/28/19 - add ceruloplasmin  - CMP, HCV RNA, HBV DNA - SVR12 - 4/22/19

## 2018-12-20 ENCOUNTER — PATIENT MESSAGE (OUTPATIENT)
Dept: HEPATOLOGY | Facility: CLINIC | Age: 58
End: 2018-12-20

## 2018-12-27 ENCOUNTER — TELEPHONE (OUTPATIENT)
Dept: PHARMACY | Facility: CLINIC | Age: 58
End: 2018-12-27

## 2018-12-27 NOTE — TELEPHONE ENCOUNTER
Refill readiness for Epclusa confirmed with patient; name/ confirmed; no missed doses; no new medications; no side effects noted; patient to  at [facility] on .    Jorge L Almaraz R.Ph., AAHIVP  Clinical Pharmacist, HIV/HCV  [facility]  Phone: [phone]

## 2019-01-14 ENCOUNTER — TELEPHONE (OUTPATIENT)
Dept: PHARMACY | Facility: CLINIC | Age: 59
End: 2019-01-14

## 2019-01-14 NOTE — TELEPHONE ENCOUNTER
" Patient states that he is very "stuffy/congested" and his wife mentions that he has some Robitussin Cough and Congestion DM (Dextromethorphan + Guaifenesin) and he question if it interacts with Epclusa. He was advised that it does NOT interact with his Epclusa, but he clarifies that he is nasally congested. It was suggested that he might need a nasal decongestant (w/ phenylephrine or pseudoephedrine) to help directly with the nasal congestion - found behind the pharmacy counter. He was also advised to take a hot shower to help open these passages or run a humidifier with his vapor rub for a more natural approach. Patient verbalized understanding and will calls houdl he run into any other questions. He states he has 14 days of Epclusa remaining. TTN  "

## 2019-01-28 ENCOUNTER — LAB VISIT (OUTPATIENT)
Dept: LAB | Facility: HOSPITAL | Age: 59
End: 2019-01-28
Attending: PHYSICIAN ASSISTANT
Payer: COMMERCIAL

## 2019-01-28 DIAGNOSIS — B18.2 CHRONIC HEPATITIS C WITHOUT HEPATIC COMA: ICD-10-CM

## 2019-01-28 DIAGNOSIS — R74.8 ABNORMAL TRANSAMINASES: ICD-10-CM

## 2019-01-28 LAB
ALBUMIN SERPL BCP-MCNC: 4.1 G/DL
ALP SERPL-CCNC: 38 U/L
ALT SERPL W/O P-5'-P-CCNC: 34 U/L
ANION GAP SERPL CALC-SCNC: 8 MMOL/L
AST SERPL-CCNC: 20 U/L
BILIRUB SERPL-MCNC: 0.3 MG/DL
BUN SERPL-MCNC: 12 MG/DL
CALCIUM SERPL-MCNC: 9.8 MG/DL
CERULOPLASMIN SERPL-MCNC: <2 MG/DL
CHLORIDE SERPL-SCNC: 100 MMOL/L
CO2 SERPL-SCNC: 27 MMOL/L
CREAT SERPL-MCNC: 0.8 MG/DL
EST. GFR  (AFRICAN AMERICAN): >60 ML/MIN/1.73 M^2
EST. GFR  (NON AFRICAN AMERICAN): >60 ML/MIN/1.73 M^2
GLUCOSE SERPL-MCNC: 105 MG/DL
POTASSIUM SERPL-SCNC: 4.2 MMOL/L
PROT SERPL-MCNC: 7.9 G/DL
SODIUM SERPL-SCNC: 135 MMOL/L

## 2019-01-28 PROCEDURE — 82390 ASSAY OF CERULOPLASMIN: CPT

## 2019-01-28 PROCEDURE — 80053 COMPREHEN METABOLIC PANEL: CPT

## 2019-01-28 PROCEDURE — 36415 COLL VENOUS BLD VENIPUNCTURE: CPT | Mod: PO

## 2019-01-28 PROCEDURE — 87522 HEPATITIS C REVRS TRNSCRPJ: CPT

## 2019-01-28 PROCEDURE — 87517 HEPATITIS B DNA QUANT: CPT

## 2019-01-29 ENCOUNTER — PATIENT MESSAGE (OUTPATIENT)
Dept: HEPATOLOGY | Facility: CLINIC | Age: 59
End: 2019-01-29

## 2019-01-29 DIAGNOSIS — E83.00 LOW CERULOPLASMIN LEVEL: Primary | ICD-10-CM

## 2019-01-30 ENCOUNTER — LAB VISIT (OUTPATIENT)
Dept: LAB | Facility: HOSPITAL | Age: 59
End: 2019-01-30
Attending: PHYSICIAN ASSISTANT
Payer: COMMERCIAL

## 2019-01-30 ENCOUNTER — TELEPHONE (OUTPATIENT)
Dept: PHARMACY | Facility: CLINIC | Age: 59
End: 2019-01-30

## 2019-01-30 DIAGNOSIS — E83.00 LOW CERULOPLASMIN LEVEL: ICD-10-CM

## 2019-01-30 LAB — CERULOPLASMIN SERPL-MCNC: 25 MG/DL

## 2019-01-30 PROCEDURE — 36415 COLL VENOUS BLD VENIPUNCTURE: CPT | Mod: PO

## 2019-01-30 PROCEDURE — 82390 ASSAY OF CERULOPLASMIN: CPT

## 2019-01-30 NOTE — TELEPHONE ENCOUNTER
Patient doing well since completion of Ecplusa.  Patient reported some join pain while taking Epclusa but side effect has since resolved.  Patient currently complaining of back pain from sciatic nerve; patient will contact primary physician to address issue.  Patient aware of SVR 12 and importance, but may have to reschedule appointment due to leaving for a trip.

## 2019-01-31 ENCOUNTER — TELEPHONE (OUTPATIENT)
Dept: HEPATOLOGY | Facility: CLINIC | Age: 59
End: 2019-01-31

## 2019-01-31 ENCOUNTER — PATIENT MESSAGE (OUTPATIENT)
Dept: HEPATOLOGY | Facility: CLINIC | Age: 59
End: 2019-01-31

## 2019-02-01 ENCOUNTER — PATIENT MESSAGE (OUTPATIENT)
Dept: HEPATOLOGY | Facility: CLINIC | Age: 59
End: 2019-02-01

## 2019-02-01 NOTE — TELEPHONE ENCOUNTER
HCV LAB REVIEW  Week 12 of Epclusa,  END OF TREATMENT  Leslie 2b, tx naive  F2    Pertinent labs:  1/28  CMP stable  HBV neg  HCV neg      [facility] message sent to pt:  Labs reviewed  Patient should be finished HCV treatment now.   There is a small chance the virus can return over the next 3 months. We will monitor blood for this.      Next labs due   - CMP, HCV RNA, HBV DNA - SVR12 - 4/22/19

## 2019-02-04 ENCOUNTER — PATIENT MESSAGE (OUTPATIENT)
Dept: HEPATOLOGY | Facility: CLINIC | Age: 59
End: 2019-02-04

## 2019-02-04 NOTE — TELEPHONE ENCOUNTER
At pt's request, please send the following info to:  Adolfo at [email or web address] or fax [phone]     Chi Alvarado completed 12 weeks of Epclusa on 1/28/19.    Thank you

## 2019-04-29 ENCOUNTER — LAB VISIT (OUTPATIENT)
Dept: LAB | Facility: HOSPITAL | Age: 59
End: 2019-04-29
Attending: PHYSICIAN ASSISTANT
Payer: COMMERCIAL

## 2019-04-29 DIAGNOSIS — B18.2 CHRONIC HEPATITIS C WITHOUT HEPATIC COMA: ICD-10-CM

## 2019-04-29 DIAGNOSIS — R74.8 ABNORMAL TRANSAMINASES: ICD-10-CM

## 2019-04-29 LAB
ALBUMIN SERPL BCP-MCNC: 4.2 G/DL (ref 3.5–5.2)
ALP SERPL-CCNC: 48 U/L (ref 55–135)
ALT SERPL W/O P-5'-P-CCNC: 24 U/L (ref 10–44)
ANION GAP SERPL CALC-SCNC: 10 MMOL/L (ref 8–16)
AST SERPL-CCNC: 23 U/L (ref 10–40)
BILIRUB SERPL-MCNC: 0.5 MG/DL (ref 0.1–1)
BUN SERPL-MCNC: 14 MG/DL (ref 6–20)
CALCIUM SERPL-MCNC: 10.1 MG/DL (ref 8.7–10.5)
CHLORIDE SERPL-SCNC: 103 MMOL/L (ref 95–110)
CO2 SERPL-SCNC: 25 MMOL/L (ref 23–29)
CREAT SERPL-MCNC: 0.8 MG/DL (ref 0.5–1.4)
EST. GFR  (AFRICAN AMERICAN): >60 ML/MIN/1.73 M^2
EST. GFR  (NON AFRICAN AMERICAN): >60 ML/MIN/1.73 M^2
GLUCOSE SERPL-MCNC: 90 MG/DL (ref 70–110)
POTASSIUM SERPL-SCNC: 4.3 MMOL/L (ref 3.5–5.1)
PROT SERPL-MCNC: 7.9 G/DL (ref 6–8.4)
SODIUM SERPL-SCNC: 138 MMOL/L (ref 136–145)

## 2019-04-29 PROCEDURE — 80053 COMPREHEN METABOLIC PANEL: CPT

## 2019-04-29 PROCEDURE — 36415 COLL VENOUS BLD VENIPUNCTURE: CPT | Mod: PO

## 2019-04-29 PROCEDURE — 87522 HEPATITIS C REVRS TRNSCRPJ: CPT

## 2019-04-29 PROCEDURE — 87517 HEPATITIS B DNA QUANT: CPT

## 2019-05-01 ENCOUNTER — TELEPHONE (OUTPATIENT)
Dept: HEPATOLOGY | Facility: CLINIC | Age: 59
End: 2019-05-01

## 2019-05-01 DIAGNOSIS — Z86.19 HISTORY OF HEPATITIS C: ICD-10-CM

## 2019-05-01 NOTE — TELEPHONE ENCOUNTER
HCV LAB REVIEW  Completed 12 weeks of Epclusa, 1/28/19  Leslie 2b, tx naive  F2    Pertinent labs:  4/29  CMP stable  HBV neg  HCV neg    These labs document SVR12 following successful HCV treatment with Epclusa   This is consistent with a cure. Relapse is not expected.   No immunity is conferred and patient could be reinfected.       Pt has been notified by [facility]    Please schedule   - HCV RNA - SVR24 - 7/29/19  - HCV RNA in 1 year - 4/2020

## 2019-05-04 DIAGNOSIS — E78.2 MIXED HYPERLIPIDEMIA: ICD-10-CM

## 2019-05-06 RX ORDER — PRAVASTATIN SODIUM 20 MG/1
TABLET ORAL
Qty: 90 TABLET | Refills: 0 | Status: SHIPPED | OUTPATIENT
Start: 2019-05-06 | End: 2019-08-05 | Stop reason: SDUPTHER

## 2019-07-29 ENCOUNTER — LAB VISIT (OUTPATIENT)
Dept: LAB | Facility: HOSPITAL | Age: 59
End: 2019-07-29
Attending: PHYSICIAN ASSISTANT
Payer: COMMERCIAL

## 2019-07-29 DIAGNOSIS — Z86.19 HISTORY OF HEPATITIS C: ICD-10-CM

## 2019-07-29 PROCEDURE — 87522 HEPATITIS C REVRS TRNSCRPJ: CPT

## 2019-07-29 PROCEDURE — 36415 COLL VENOUS BLD VENIPUNCTURE: CPT | Mod: PO

## 2019-08-01 LAB
HCV RNA SERPL NAA+PROBE-LOG IU: <1.08 LOG (10) IU/ML
HCV RNA SERPL QL NAA+PROBE: NOT DETECTED IU/ML
HCV RNA SPEC NAA+PROBE-ACNC: <12 IU/ML

## 2019-08-05 DIAGNOSIS — E78.2 MIXED HYPERLIPIDEMIA: ICD-10-CM

## 2019-08-05 RX ORDER — PRAVASTATIN SODIUM 20 MG/1
TABLET ORAL
Qty: 90 TABLET | Refills: 0 | Status: SHIPPED | OUTPATIENT
Start: 2019-08-05 | End: 2019-11-02 | Stop reason: SDUPTHER

## 2019-08-05 NOTE — TELEPHONE ENCOUNTER
Refill approved.  Due for annual physical in 1 month with labs prior.  Please sched.    Thank you,  Johan

## 2019-09-05 ENCOUNTER — OFFICE VISIT (OUTPATIENT)
Dept: FAMILY MEDICINE | Facility: CLINIC | Age: 59
End: 2019-09-05
Payer: COMMERCIAL

## 2019-09-05 VITALS
DIASTOLIC BLOOD PRESSURE: 74 MMHG | SYSTOLIC BLOOD PRESSURE: 120 MMHG | OXYGEN SATURATION: 98 % | HEART RATE: 98 BPM | BODY MASS INDEX: 29.77 KG/M2 | HEIGHT: 67 IN | TEMPERATURE: 99 F | WEIGHT: 189.69 LBS

## 2019-09-05 DIAGNOSIS — Z78.9 ALCOHOL CONSUMPTION HEAVY: Chronic | ICD-10-CM

## 2019-09-05 DIAGNOSIS — Z12.11 ENCOUNTER FOR FIT (FECAL IMMUNOCHEMICAL TEST) SCREENING: ICD-10-CM

## 2019-09-05 DIAGNOSIS — Z00.00 ROUTINE MEDICAL EXAM: Primary | ICD-10-CM

## 2019-09-05 DIAGNOSIS — F17.200 TOBACCO DEPENDENCE: Chronic | ICD-10-CM

## 2019-09-05 DIAGNOSIS — E78.2 MIXED HYPERLIPIDEMIA: Chronic | ICD-10-CM

## 2019-09-05 PROCEDURE — 99999 PR PBB SHADOW E&M-EST. PATIENT-LVL III: ICD-10-PCS | Mod: PBBFAC,,, | Performed by: INTERNAL MEDICINE

## 2019-09-05 PROCEDURE — 99396 PR PREVENTIVE VISIT,EST,40-64: ICD-10-PCS | Mod: S$GLB,,, | Performed by: INTERNAL MEDICINE

## 2019-09-05 PROCEDURE — 99999 PR PBB SHADOW E&M-EST. PATIENT-LVL III: CPT | Mod: PBBFAC,,, | Performed by: INTERNAL MEDICINE

## 2019-09-05 PROCEDURE — 99396 PREV VISIT EST AGE 40-64: CPT | Mod: S$GLB,,, | Performed by: INTERNAL MEDICINE

## 2019-09-05 RX ORDER — MELOXICAM 15 MG/1
TABLET ORAL
Refills: 3 | COMMUNITY
Start: 2019-07-15 | End: 2020-10-19

## 2019-09-05 NOTE — ASSESSMENT & PLAN NOTE
I counseled the patient on smoking cessation, risks associated with smoking, having a quit date, nicotine replacement, medications that can aid in cessation, and having a plan for future cravings.  The patient stated that he isn't currently interested.  Seems precomtemplative

## 2019-09-06 ENCOUNTER — LAB VISIT (OUTPATIENT)
Dept: LAB | Facility: HOSPITAL | Age: 59
End: 2019-09-06
Attending: INTERNAL MEDICINE
Payer: COMMERCIAL

## 2019-09-06 ENCOUNTER — PATIENT MESSAGE (OUTPATIENT)
Dept: FAMILY MEDICINE | Facility: CLINIC | Age: 59
End: 2019-09-06

## 2019-09-06 DIAGNOSIS — Z12.11 ENCOUNTER FOR FIT (FECAL IMMUNOCHEMICAL TEST) SCREENING: ICD-10-CM

## 2019-09-06 DIAGNOSIS — Z00.00 ROUTINE MEDICAL EXAM: Primary | ICD-10-CM

## 2019-09-06 DIAGNOSIS — Z00.00 ROUTINE MEDICAL EXAM: ICD-10-CM

## 2019-09-06 LAB
BASOPHILS # BLD AUTO: 0.06 K/UL (ref 0–0.2)
BASOPHILS NFR BLD: 0.7 % (ref 0–1.9)
CHOLEST SERPL-MCNC: 166 MG/DL (ref 120–199)
CHOLEST/HDLC SERPL: 4.7 {RATIO} (ref 2–5)
DIFFERENTIAL METHOD: ABNORMAL
EOSINOPHIL # BLD AUTO: 0.3 K/UL (ref 0–0.5)
EOSINOPHIL NFR BLD: 3.9 % (ref 0–8)
ERYTHROCYTE [DISTWIDTH] IN BLOOD BY AUTOMATED COUNT: 12.4 % (ref 11.5–14.5)
HCT VFR BLD AUTO: 42.1 % (ref 40–54)
HDLC SERPL-MCNC: 35 MG/DL (ref 40–75)
HDLC SERPL: 21.1 % (ref 20–50)
HGB BLD-MCNC: 13.7 G/DL (ref 14–18)
IMM GRANULOCYTES # BLD AUTO: 0.02 K/UL (ref 0–0.04)
IMM GRANULOCYTES NFR BLD AUTO: 0.2 % (ref 0–0.5)
LDLC SERPL CALC-MCNC: 108 MG/DL (ref 63–159)
LYMPHOCYTES # BLD AUTO: 1.8 K/UL (ref 1–4.8)
LYMPHOCYTES NFR BLD: 22.2 % (ref 18–48)
MCH RBC QN AUTO: 31.6 PG (ref 27–31)
MCHC RBC AUTO-ENTMCNC: 32.5 G/DL (ref 32–36)
MCV RBC AUTO: 97 FL (ref 82–98)
MONOCYTES # BLD AUTO: 0.7 K/UL (ref 0.3–1)
MONOCYTES NFR BLD: 8.5 % (ref 4–15)
NEUTROPHILS # BLD AUTO: 5.2 K/UL (ref 1.8–7.7)
NEUTROPHILS NFR BLD: 64.5 % (ref 38–73)
NONHDLC SERPL-MCNC: 131 MG/DL
NRBC BLD-RTO: 0 /100 WBC
PLATELET # BLD AUTO: 362 K/UL (ref 150–350)
PMV BLD AUTO: 10.1 FL (ref 9.2–12.9)
RBC # BLD AUTO: 4.33 M/UL (ref 4.6–6.2)
TRIGL SERPL-MCNC: 115 MG/DL (ref 30–150)
WBC # BLD AUTO: 8.01 K/UL (ref 3.9–12.7)

## 2019-09-06 PROCEDURE — 85025 COMPLETE CBC W/AUTO DIFF WBC: CPT

## 2019-09-06 PROCEDURE — 36415 COLL VENOUS BLD VENIPUNCTURE: CPT | Mod: PO

## 2019-09-06 PROCEDURE — 82274 ASSAY TEST FOR BLOOD FECAL: CPT

## 2019-09-06 PROCEDURE — 80061 LIPID PANEL: CPT

## 2019-09-12 LAB — HEMOCCULT STL QL IA: NEGATIVE

## 2019-09-12 NOTE — PROGRESS NOTES
Your stool results are normal.  Please continue your current medications and doses.  Please feel free to contact me with any questions or concerns.    Sincerely,  Delio Cruz  [email or web address]

## 2019-10-04 ENCOUNTER — OFFICE VISIT (OUTPATIENT)
Dept: OPTOMETRY | Facility: CLINIC | Age: 59
End: 2019-10-04
Payer: COMMERCIAL

## 2019-10-04 DIAGNOSIS — H52.4 PRESBYOPIA: ICD-10-CM

## 2019-10-04 DIAGNOSIS — H52.03 HYPEROPIA, BILATERAL: ICD-10-CM

## 2019-10-04 DIAGNOSIS — H25.13 NS (NUCLEAR SCLEROSIS), BILATERAL: Primary | ICD-10-CM

## 2019-10-04 DIAGNOSIS — Z04.9 DISEASE RULED OUT AFTER EXAMINATION: ICD-10-CM

## 2019-10-04 PROCEDURE — 92004 PR EYE EXAM, NEW PATIENT,COMPREHESV: ICD-10-PCS | Mod: S$GLB,,, | Performed by: OPTOMETRIST

## 2019-10-04 PROCEDURE — 99999 PR PBB SHADOW E&M-EST. PATIENT-LVL II: ICD-10-PCS | Mod: PBBFAC,,, | Performed by: OPTOMETRIST

## 2019-10-04 PROCEDURE — 92015 DETERMINE REFRACTIVE STATE: CPT | Mod: S$GLB,,, | Performed by: OPTOMETRIST

## 2019-10-04 PROCEDURE — 92004 COMPRE OPH EXAM NEW PT 1/>: CPT | Mod: S$GLB,,, | Performed by: OPTOMETRIST

## 2019-10-04 PROCEDURE — 99999 PR PBB SHADOW E&M-EST. PATIENT-LVL II: CPT | Mod: PBBFAC,,, | Performed by: OPTOMETRIST

## 2019-10-04 PROCEDURE — 92015 PR REFRACTION: ICD-10-PCS | Mod: S$GLB,,, | Performed by: OPTOMETRIST

## 2019-10-04 NOTE — PROGRESS NOTES
HPI     Last eye exam was approximately 1 years ago.      Pt here for annual  Pt states he would like to try bifocals  Patient denies diplopia, headaches, flashes/floaters, pain, and   itching/burning/tearing.    Pt does not use any eye drops.      Last edited by Lesly Worley on 10/4/2019  2:42 PM. (History)            Assessment /Plan     For exam results, see Encounter Report.    NS (nuclear sclerosis), bilateral   Mild, monitor    Presbyopia  Hyperopia, bilateral   Rx specs   Discussed PAL and adaptation    Disease ruled out after examination   Good overall ocular health, monitor yearly

## 2019-11-02 DIAGNOSIS — E78.2 MIXED HYPERLIPIDEMIA: ICD-10-CM

## 2019-11-03 RX ORDER — PRAVASTATIN SODIUM 20 MG/1
TABLET ORAL
Qty: 90 TABLET | Refills: 0 | Status: SHIPPED | OUTPATIENT
Start: 2019-11-03 | End: 2020-10-19 | Stop reason: SDUPTHER

## 2020-06-05 ENCOUNTER — NURSE TRIAGE (OUTPATIENT)
Dept: ADMINISTRATIVE | Facility: CLINIC | Age: 60
End: 2020-06-05

## 2020-06-05 NOTE — TELEPHONE ENCOUNTER
Numbness and tingling left arm. Advised as per protocol.    Reason for Disposition   [1] Numbness (i.e., loss of sensation) of the face, arm / hand, or leg / foot on one side of the body AND [2] sudden onset AND [3] present now    Additional Information   Negative: [1] SEVERE weakness (i.e., unable to walk or barely able to walk, requires support) AND [2] new onset or worsening   Negative: [1] Weakness (i.e., paralysis, loss of muscle strength) of the face, arm / hand, or leg / foot on one side of the body AND [2] sudden onset AND [3] present now    Protocols used: NEUROLOGIC DEFICIT-A-AH

## 2020-08-06 ENCOUNTER — OFFICE VISIT (OUTPATIENT)
Dept: FAMILY MEDICINE | Facility: CLINIC | Age: 60
End: 2020-08-06
Payer: COMMERCIAL

## 2020-08-06 VITALS
HEIGHT: 67 IN | SYSTOLIC BLOOD PRESSURE: 120 MMHG | BODY MASS INDEX: 24.1 KG/M2 | DIASTOLIC BLOOD PRESSURE: 76 MMHG | HEART RATE: 90 BPM | TEMPERATURE: 97 F | WEIGHT: 153.56 LBS | OXYGEN SATURATION: 98 %

## 2020-08-06 DIAGNOSIS — R63.4 WEIGHT LOSS: Primary | ICD-10-CM

## 2020-08-06 DIAGNOSIS — F32.1 CURRENT MODERATE EPISODE OF MAJOR DEPRESSIVE DISORDER WITHOUT PRIOR EPISODE: ICD-10-CM

## 2020-08-06 DIAGNOSIS — G47.00 INSOMNIA, UNSPECIFIED TYPE: ICD-10-CM

## 2020-08-06 DIAGNOSIS — E78.2 MIXED HYPERLIPIDEMIA: ICD-10-CM

## 2020-08-06 DIAGNOSIS — F17.200 TOBACCO DEPENDENCE: ICD-10-CM

## 2020-08-06 DIAGNOSIS — Z78.9 ALCOHOL CONSUMPTION HEAVY: ICD-10-CM

## 2020-08-06 PROCEDURE — 99999 PR PBB SHADOW E&M-EST. PATIENT-LVL IV: ICD-10-PCS | Mod: PBBFAC,,, | Performed by: NURSE PRACTITIONER

## 2020-08-06 PROCEDURE — 3008F BODY MASS INDEX DOCD: CPT | Mod: CPTII,S$GLB,, | Performed by: NURSE PRACTITIONER

## 2020-08-06 PROCEDURE — 99999 PR PBB SHADOW E&M-EST. PATIENT-LVL IV: CPT | Mod: PBBFAC,,, | Performed by: NURSE PRACTITIONER

## 2020-08-06 PROCEDURE — 3008F PR BODY MASS INDEX (BMI) DOCUMENTED: ICD-10-PCS | Mod: CPTII,S$GLB,, | Performed by: NURSE PRACTITIONER

## 2020-08-06 PROCEDURE — 99214 OFFICE O/P EST MOD 30 MIN: CPT | Mod: S$GLB,,, | Performed by: NURSE PRACTITIONER

## 2020-08-06 PROCEDURE — 99214 PR OFFICE/OUTPT VISIT, EST, LEVL IV, 30-39 MIN: ICD-10-PCS | Mod: S$GLB,,, | Performed by: NURSE PRACTITIONER

## 2020-08-06 RX ORDER — TRAZODONE HYDROCHLORIDE 50 MG/1
50 TABLET ORAL NIGHTLY
Qty: 90 TABLET | Refills: 1 | Status: SHIPPED | OUTPATIENT
Start: 2020-08-06 | End: 2020-10-19

## 2020-08-06 RX ORDER — OXYCODONE AND ACETAMINOPHEN 5; 325 MG/1; MG/1
1 TABLET ORAL
COMMUNITY
Start: 2020-08-04 | End: 2020-08-09

## 2020-08-06 RX ORDER — ESCITALOPRAM OXALATE 10 MG/1
10 TABLET ORAL DAILY
Qty: 90 TABLET | Refills: 1 | Status: SHIPPED | OUTPATIENT
Start: 2020-08-06 | End: 2020-09-21 | Stop reason: ALTCHOICE

## 2020-08-06 RX ORDER — SILVER SULFADIAZINE 10 G/1000G
CREAM TOPICAL
COMMUNITY
Start: 2020-08-04 | End: 2020-10-19

## 2020-08-06 NOTE — PROGRESS NOTES
Subjective:       Patient ID: Chi Alvarado Jr. is a 60 y.o. male.    Chief Complaint: Hyperlipidemia    60-year-old male presents to the clinic today with complaint of depression and difficulty sleeping since December of 2019.  He states he lost his mother in December and then lost his father in January.  He has been having a lot of problems with depression and sleeping ever since then.  He is tearful at this time. He states it is very hard for him to clean out his parent's house after helping take care them for several years.  He wakes up 3-4 times during the night thinking about them.  He denies any suicidal thoughts, homicidal ideations, or hallucinations.  He still drinking a fair amount of alcohol. He drinks 6-12 beers every weekend instead of 2 cases like he was in the past.  He is still smoking half a pack of cigarettes a day.  I explained to him that alcohol was a depressant and he should try to quit drinking and quit smoking.  He has also weight loss of about 50 lb since he lost his parents. He thinks it has to do with stress and the fact that he got false teeth and he is unable to eat a lot of the foods that he could eat in the past.  He also has a burn to his left hand happened at work on hot tar that is dressed at this time.  He is scheduled to see a plastic surgeon for follow-up.  He denies any headaches, dizziness, or blurred vision.  He denies any cardiac chest pain, heart palpitations, shortness of breath, or swelling to lower extremities.  He takes his Pravachol as prescribed.    Past Medical History:   Diagnosis Date    Alcohol consumption heavy 10/6/2014    C1 cervical fracture     History of hepatitis C, s/p successful Rx w/ SVR24 (cure) - 7/2019 9/9/2014    S/p Epclusa w/ SVR    Mandible fracture     Mixed hyperlipidemia 8/31/2018     Past Surgical History:   Procedure Laterality Date    MANDIBLE FRACTURE SURGERY        reports that he has been smoking. He has a 20.50 pack-year  smoking history. He has never used smokeless tobacco. He reports current alcohol use. He reports that he does not use drugs.  Review of Systems   Respiratory: Negative for cough, shortness of breath and wheezing.    Cardiovascular: Negative for chest pain, palpitations and leg swelling.   Gastrointestinal: Negative for abdominal pain, blood in stool, constipation, diarrhea, nausea and vomiting.   Musculoskeletal: Negative for gait problem.   Skin: Negative for rash.   Neurological: Negative for dizziness, light-headedness and headaches.   Psychiatric/Behavioral: Positive for sleep disturbance. Negative for hallucinations and suicidal ideas. The patient is not nervous/anxious.         Depression        Objective:      Physical Exam  Vitals signs reviewed.   Constitutional:       General: He is not in acute distress.     Appearance: He is well-developed. He is not diaphoretic.      Comments: Tearful    Eyes:      General: No scleral icterus.        Right eye: No discharge.         Left eye: No discharge.      Conjunctiva/sclera: Conjunctivae normal.      Pupils: Pupils are equal, round, and reactive to light.   Neck:      Musculoskeletal: Normal range of motion and neck supple.      Vascular: No JVD.   Cardiovascular:      Rate and Rhythm: Normal rate and regular rhythm.      Heart sounds: Normal heart sounds. No murmur.   Pulmonary:      Effort: Pulmonary effort is normal. No respiratory distress.      Breath sounds: Normal breath sounds. No wheezing or rales.   Abdominal:      General: Bowel sounds are normal.      Palpations: Abdomen is soft.      Tenderness: There is no abdominal tenderness.   Musculoskeletal: Normal range of motion.   Skin:     General: Skin is warm and dry.   Neurological:      Mental Status: He is alert and oriented to person, place, and time.   Psychiatric:         Behavior: Behavior normal.         Thought Content: Thought content normal.         Judgment: Judgment normal.      Comments:  Depressed mood          Assessment:       1. Weight loss    2. Mixed hyperlipidemia    3. Tobacco dependence    4. Alcohol consumption heavy    5. Current moderate episode of major depressive disorder without prior episode    6. Insomnia, unspecified type        Plan:         Weight loss  -     TSH; Future; Expected date: 08/06/2020    Mixed hyperlipidemia  - The current medical regimen is effective;  continue present plan and medications.    Tobacco dependence  - stressed the importance of needing to quit smoking    Alcohol consumption heavy  - needs to decrease alcohol intake and try to quit all together     Current moderate episode of major depressive disorder without prior episode  -     escitalopram oxalate (LEXAPRO) 10 MG tablet; Take 1 tablet (10 mg total) by mouth once daily.  Dispense: 90 tablet; Refill: 1  - Start Lexapro 10 mg po daily    Insomnia, unspecified type  -     traZODone (DESYREL) 50 MG tablet; Take 1 tablet (50 mg total) by mouth every evening.  Dispense: 90 tablet; Refill: 1  - Start Trazodone 50 mg po every night    - I explained to him that the Lexapro would probably take several weeks to help with the depression but the Trazodone should work tonight with sleep.

## 2020-08-06 NOTE — PATIENT INSTRUCTIONS
Start Lexapro 10 mg po daily  Start Trazodone 50 mg every night for sleep   Fasting labs one week before see Dr. Cruz  Follow up with  for physical as scheduled

## 2020-09-08 ENCOUNTER — LAB VISIT (OUTPATIENT)
Dept: LAB | Facility: HOSPITAL | Age: 60
End: 2020-09-08
Attending: INTERNAL MEDICINE
Payer: COMMERCIAL

## 2020-09-08 DIAGNOSIS — R63.4 WEIGHT LOSS: ICD-10-CM

## 2020-09-08 DIAGNOSIS — Z00.00 ROUTINE MEDICAL EXAM: ICD-10-CM

## 2020-09-08 LAB
ALBUMIN SERPL BCP-MCNC: 4.1 G/DL (ref 3.5–5.2)
ALP SERPL-CCNC: 59 U/L (ref 55–135)
ALT SERPL W/O P-5'-P-CCNC: 13 U/L (ref 10–44)
ANION GAP SERPL CALC-SCNC: 9 MMOL/L (ref 8–16)
AST SERPL-CCNC: 22 U/L (ref 10–40)
BASOPHILS # BLD AUTO: 0.05 K/UL (ref 0–0.2)
BASOPHILS NFR BLD: 0.4 % (ref 0–1.9)
BILIRUB SERPL-MCNC: 1.1 MG/DL (ref 0.1–1)
BUN SERPL-MCNC: 8 MG/DL (ref 6–20)
CALCIUM SERPL-MCNC: 8.8 MG/DL (ref 8.7–10.5)
CHLORIDE SERPL-SCNC: 102 MMOL/L (ref 95–110)
CHOLEST SERPL-MCNC: 144 MG/DL (ref 120–199)
CHOLEST/HDLC SERPL: 2.5 {RATIO} (ref 2–5)
CO2 SERPL-SCNC: 25 MMOL/L (ref 23–29)
CREAT SERPL-MCNC: 0.7 MG/DL (ref 0.5–1.4)
DIFFERENTIAL METHOD: ABNORMAL
EOSINOPHIL # BLD AUTO: 0.1 K/UL (ref 0–0.5)
EOSINOPHIL NFR BLD: 0.9 % (ref 0–8)
ERYTHROCYTE [DISTWIDTH] IN BLOOD BY AUTOMATED COUNT: 12.4 % (ref 11.5–14.5)
EST. GFR  (AFRICAN AMERICAN): >60 ML/MIN/1.73 M^2
EST. GFR  (NON AFRICAN AMERICAN): >60 ML/MIN/1.73 M^2
GLUCOSE SERPL-MCNC: 104 MG/DL (ref 70–110)
HCT VFR BLD AUTO: 44.6 % (ref 40–54)
HDLC SERPL-MCNC: 57 MG/DL (ref 40–75)
HDLC SERPL: 39.6 % (ref 20–50)
HGB BLD-MCNC: 14.3 G/DL (ref 14–18)
IMM GRANULOCYTES # BLD AUTO: 0.04 K/UL (ref 0–0.04)
IMM GRANULOCYTES NFR BLD AUTO: 0.3 % (ref 0–0.5)
LDLC SERPL CALC-MCNC: 71.6 MG/DL (ref 63–159)
LYMPHOCYTES # BLD AUTO: 1.2 K/UL (ref 1–4.8)
LYMPHOCYTES NFR BLD: 9.9 % (ref 18–48)
MCH RBC QN AUTO: 32.7 PG (ref 27–31)
MCHC RBC AUTO-ENTMCNC: 32.1 G/DL (ref 32–36)
MCV RBC AUTO: 102 FL (ref 82–98)
MONOCYTES # BLD AUTO: 0.9 K/UL (ref 0.3–1)
MONOCYTES NFR BLD: 8 % (ref 4–15)
NEUTROPHILS # BLD AUTO: 9.4 K/UL (ref 1.8–7.7)
NEUTROPHILS NFR BLD: 80.5 % (ref 38–73)
NONHDLC SERPL-MCNC: 87 MG/DL
NRBC BLD-RTO: 0 /100 WBC
PLATELET # BLD AUTO: 316 K/UL (ref 150–350)
PMV BLD AUTO: 10 FL (ref 9.2–12.9)
POTASSIUM SERPL-SCNC: 4.2 MMOL/L (ref 3.5–5.1)
PROT SERPL-MCNC: 7.2 G/DL (ref 6–8.4)
RBC # BLD AUTO: 4.37 M/UL (ref 4.6–6.2)
SODIUM SERPL-SCNC: 136 MMOL/L (ref 136–145)
TRIGL SERPL-MCNC: 77 MG/DL (ref 30–150)
TSH SERPL DL<=0.005 MIU/L-ACNC: 0.97 UIU/ML (ref 0.4–4)
WBC # BLD AUTO: 11.67 K/UL (ref 3.9–12.7)

## 2020-09-08 PROCEDURE — 80053 COMPREHEN METABOLIC PANEL: CPT

## 2020-09-08 PROCEDURE — 36415 COLL VENOUS BLD VENIPUNCTURE: CPT | Mod: PO

## 2020-09-08 PROCEDURE — 84443 ASSAY THYROID STIM HORMONE: CPT

## 2020-09-08 PROCEDURE — 80061 LIPID PANEL: CPT

## 2020-09-08 PROCEDURE — 85025 COMPLETE CBC W/AUTO DIFF WBC: CPT

## 2020-09-14 ENCOUNTER — TELEPHONE (OUTPATIENT)
Dept: FAMILY MEDICINE | Facility: CLINIC | Age: 60
End: 2020-09-14

## 2020-09-18 DIAGNOSIS — Z12.11 COLON CANCER SCREENING: ICD-10-CM

## 2020-09-21 ENCOUNTER — OFFICE VISIT (OUTPATIENT)
Dept: FAMILY MEDICINE | Facility: CLINIC | Age: 60
End: 2020-09-21
Payer: COMMERCIAL

## 2020-09-21 VITALS
BODY MASS INDEX: 23.34 KG/M2 | HEART RATE: 96 BPM | SYSTOLIC BLOOD PRESSURE: 138 MMHG | OXYGEN SATURATION: 98 % | WEIGHT: 148.69 LBS | HEIGHT: 67 IN | TEMPERATURE: 98 F | DIASTOLIC BLOOD PRESSURE: 70 MMHG

## 2020-09-21 DIAGNOSIS — Z23 FLU VACCINE NEED: ICD-10-CM

## 2020-09-21 DIAGNOSIS — Z00.00 ROUTINE MEDICAL EXAM: Primary | ICD-10-CM

## 2020-09-21 DIAGNOSIS — E78.2 MIXED HYPERLIPIDEMIA: ICD-10-CM

## 2020-09-21 DIAGNOSIS — F17.200 TOBACCO DEPENDENCE: Chronic | ICD-10-CM

## 2020-09-21 DIAGNOSIS — F43.21 ADJUSTMENT DISORDER WITH DEPRESSED MOOD: ICD-10-CM

## 2020-09-21 DIAGNOSIS — R63.4 UNINTENTIONAL WEIGHT LOSS: ICD-10-CM

## 2020-09-21 DIAGNOSIS — F51.04 PSYCHOPHYSIOLOGICAL INSOMNIA: ICD-10-CM

## 2020-09-21 DIAGNOSIS — Z78.9 ALCOHOL USE: Chronic | ICD-10-CM

## 2020-09-21 DIAGNOSIS — Z23 NEED FOR VACCINATION FOR PNEUMOCOCCUS: ICD-10-CM

## 2020-09-21 DIAGNOSIS — Z12.11 ENCOUNTER FOR FIT (FECAL IMMUNOCHEMICAL TEST) SCREENING: ICD-10-CM

## 2020-09-21 PROCEDURE — 90471 IMMUNIZATION ADMIN: CPT | Mod: S$GLB,,, | Performed by: INTERNAL MEDICINE

## 2020-09-21 PROCEDURE — 90471 FLU VACCINE (QUAD) GREATER THAN OR EQUAL TO 3YO PRESERVATIVE FREE IM: ICD-10-PCS | Mod: S$GLB,,, | Performed by: INTERNAL MEDICINE

## 2020-09-21 PROCEDURE — 90686 IIV4 VACC NO PRSV 0.5 ML IM: CPT | Mod: S$GLB,,, | Performed by: INTERNAL MEDICINE

## 2020-09-21 PROCEDURE — 99999 PR PBB SHADOW E&M-EST. PATIENT-LVL III: ICD-10-PCS | Mod: PBBFAC,,, | Performed by: INTERNAL MEDICINE

## 2020-09-21 PROCEDURE — 99999 PR PBB SHADOW E&M-EST. PATIENT-LVL III: CPT | Mod: PBBFAC,,, | Performed by: INTERNAL MEDICINE

## 2020-09-21 PROCEDURE — 90472 IMMUNIZATION ADMIN EACH ADD: CPT | Mod: S$GLB,,, | Performed by: INTERNAL MEDICINE

## 2020-09-21 PROCEDURE — 99396 PR PREVENTIVE VISIT,EST,40-64: ICD-10-PCS | Mod: 25,S$GLB,, | Performed by: INTERNAL MEDICINE

## 2020-09-21 PROCEDURE — 90732 PPSV23 VACC 2 YRS+ SUBQ/IM: CPT | Mod: S$GLB,,, | Performed by: INTERNAL MEDICINE

## 2020-09-21 PROCEDURE — 90472 PNEUMOCOCCAL POLYSACCHARIDE VACCINE 23-VALENT =>2YO SQ IM: ICD-10-PCS | Mod: S$GLB,,, | Performed by: INTERNAL MEDICINE

## 2020-09-21 PROCEDURE — 3008F PR BODY MASS INDEX (BMI) DOCUMENTED: ICD-10-PCS | Mod: CPTII,S$GLB,, | Performed by: INTERNAL MEDICINE

## 2020-09-21 PROCEDURE — 3008F BODY MASS INDEX DOCD: CPT | Mod: CPTII,S$GLB,, | Performed by: INTERNAL MEDICINE

## 2020-09-21 PROCEDURE — 90732 PNEUMOCOCCAL POLYSACCHARIDE VACCINE 23-VALENT =>2YO SQ IM: ICD-10-PCS | Mod: S$GLB,,, | Performed by: INTERNAL MEDICINE

## 2020-09-21 PROCEDURE — 90686 FLU VACCINE (QUAD) GREATER THAN OR EQUAL TO 3YO PRESERVATIVE FREE IM: ICD-10-PCS | Mod: S$GLB,,, | Performed by: INTERNAL MEDICINE

## 2020-09-21 PROCEDURE — 99396 PREV VISIT EST AGE 40-64: CPT | Mod: 25,S$GLB,, | Performed by: INTERNAL MEDICINE

## 2020-09-21 RX ORDER — SERTRALINE HYDROCHLORIDE 100 MG/1
100 TABLET, FILM COATED ORAL DAILY
Qty: 90 TABLET | Refills: 0 | Status: SHIPPED | OUTPATIENT
Start: 2020-09-21 | End: 2020-10-19

## 2020-09-21 NOTE — PROGRESS NOTES
Assessment & Plan  Problem List Items Addressed This Visit        Psychiatric    Alcohol use (Chronic)    Overview     Previous heavy alcohol consumption.  Reduced to only on weekends.  12 beers per weekend.          Current Assessment & Plan     Counseled on keeping up great work with reduced intake.             Cardiac/Vascular    Mixed hyperlipidemia (Chronic)    Current Assessment & Plan     Restart statin            Other    Tobacco dependence (Chronic)    Current Assessment & Plan     I counseled the patient on smoking cessation, risks associated with smoking, having a quit date, nicotine replacement, medications that can aid in cessation, and having a plan for future cravings.  The patient stated that he is not interested in smoking cessation clinic at this time.              Other Visit Diagnoses     Routine medical exam    -  Primary  -    Discussed healthy diet, regular exercise, necessary labs, age appropriate cancer screening, and routine vaccinations.       Encounter for FIT (fecal immunochemical test) screening    -  FitKit was given to patient on 9/21/2020 1:00 PM          Adjustment disorder with depressed mood    -  Change to sertraline to see if this helped his appetite more.     Relevant Medications    sertraline (ZOLOFT) 100 MG tablet    Psychophysiological insomnia    -  Ok to increase trazodone to 100mg at night.  Counseled on recommendations for avoiding BZDs    Unintentional weight loss    -  Suspect due to psychogenic loss of appetite.  If continued weight loss at follow up, will check CT    Flu vaccine need    -  vaccinate    Relevant Orders    Influenza - Quadrivalent (PF) (Completed)    Need for vaccination for pneumococcus    -  vaccinate    Relevant Orders    Pneumococcal Polysaccharide Vaccine (23 Valent) (SQ/IM) (Completed)            Health Maintenance reviewed, as above.    Follow-up: Follow up in about 4 weeks (around 10/19/2020) for weight loss and  depression.    ______________________________________________________________________    Chief Complaint  Chief Complaint   Patient presents with    Annual Exam       HPI  Chi Alvarado Jr. is a 60 y.o. male with multiple medical diagnoses as listed in the medical history and problem list that presents for routine physical.  Pt is known to me with his last appointment 8/6/2020.  He was seen by my partner at that time.  He had labs prior to this visit that showed reassuring CBC, CMP, lipids, TSH.  He was started on Lexapro and trazodone for depression with insomnia after losing both of his parents within a month.     He has recently been dealing with selling their house which has recently done.  He denies that his SSRI and trazodone which didn't help.  He is not getting along with his sister due to items with the POA and Living Will.  He feels that he will likely be estranged from the older sister forever now.  He reports that his appetite has been poor.  He has 205# prior to this loss.  Now ~149#.  No F/C/NS, dysphagia, early satiety, N/V/C/D/BRBPR, bleeding/bruising.      He beer consumption is still 12 over the course of the weekend and none on weekdays.     The 10-year ASCVD risk score (Yolande MAN Jr., et al., 2013) is: 9.8%    Values used to calculate the score:      Age: 60 years      Sex: Male      Is Non- : No      Diabetic: No      Tobacco smoker: Yes      Systolic Blood Pressure: 138 mmHg      Is BP treated: No      HDL Cholesterol: 57 mg/dL      Total Cholesterol: 144 mg/dL    PAST MEDICAL HISTORY:  Past Medical History:   Diagnosis Date    Alcohol consumption heavy 10/6/2014    C1 cervical fracture     History of hepatitis C, s/p successful Rx w/ SVR24 (cure) - 7/2019 9/9/2014    S/p Epclusa w/ SVR    Mandible fracture     Mixed hyperlipidemia 8/31/2018       PAST SURGICAL HISTORY:  Past Surgical History:   Procedure Laterality Date    MANDIBLE FRACTURE SURGERY          SOCIAL HISTORY:  Social History     Socioeconomic History    Marital status:      Spouse name: Not on file    Number of children: Not on file    Years of education: Not on file    Highest education level: Not on file   Occupational History     Employer: [employer]   Social Needs    Financial resource strain: Not on file    Food insecurity     Worry: Not on file     Inability: Not on file    Transportation needs     Medical: Not on file     Non-medical: Not on file   Tobacco Use    Smoking status: Current Every Day Smoker     Packs/day: 0.50     Years: 41.00     Pack years: 20.50    Smokeless tobacco: Never Used   Substance and Sexual Activity    Alcohol use: Yes     Comment: weekends 6 beers a day    Drug use: No    Sexual activity: Yes     Partners: Female     Comment:    Lifestyle    Physical activity     Days per week: Not on file     Minutes per session: Not on file    Stress: Not on file   Relationships    Social connections     Talks on phone: Not on file     Gets together: Not on file     Attends Latter day service: Not on file     Active member of club or organization: Not on file     Attends meetings of clubs or organizations: Not on file     Relationship status: Not on file   Other Topics Concern    Not on file   Social History Narrative    Not on file       FAMILY HISTORY:  Family History   Problem Relation Age of Onset    Breast cancer Mother     Lung cancer Mother     Heart disease Father     Heart attack Father        ALLERGIES AND MEDICATIONS: updated and reviewed.  Review of patient's allergies indicates:  No Known Allergies  Current Outpatient Medications   Medication Sig Dispense Refill    meloxicam (MOBIC) 15 MG tablet TK 1 T PO QD WITH A MEAL  3    pravastatin (PRAVACHOL) 20 MG tablet TAKE 1 TABLET(20 MG) BY MOUTH EVERY DAY 90 tablet 0    sertraline (ZOLOFT) 100 MG tablet Take 1 tablet (100 mg total) by mouth once daily. Crack the 1st 3  "tabs in half and take half tab daily for 6 days then increase to full tab 90 tablet 0    silver sulfADIAZINE 1% (SILVADENE) 1 % cream Apply topically.      traZODone (DESYREL) 50 MG tablet Take 1 tablet (50 mg total) by mouth every evening. (Patient not taking: Reported on 9/21/2020) 90 tablet 1     No current facility-administered medications for this visit.          ROS  Review of Systems   Constitutional: Negative for chills, fever and unexpected weight change.   HENT: Negative for congestion, dental problem, ear pain, hearing loss, rhinorrhea, sore throat and trouble swallowing.    Eyes: Negative for discharge, redness and visual disturbance.   Respiratory: Negative for cough, chest tightness, shortness of breath and wheezing.    Cardiovascular: Negative for chest pain, palpitations and leg swelling.   Gastrointestinal: Negative for abdominal pain, constipation, diarrhea, nausea and vomiting.   Endocrine: Negative for polydipsia, polyphagia and polyuria.   Genitourinary: Negative for decreased urine volume, dysuria and hematuria.   Musculoskeletal: Negative for arthralgias and myalgias.   Skin: Negative for color change and rash.   Neurological: Negative for dizziness, weakness, light-headedness and headaches.   Psychiatric/Behavioral: Positive for dysphoric mood and sleep disturbance. Negative for decreased concentration and suicidal ideas.           Physical Exam  Vitals:    09/21/20 0904   BP: 138/70   Pulse: 96   Temp: 97.6 °F (36.4 °C)   SpO2: 98%   Weight: 67.4 kg (148 lb 11.2 oz)   Height: 5' 7" (1.702 m)    Body mass index is 23.29 kg/m².  Weight: 67.4 kg (148 lb 11.2 oz)   Height: 5' 7" (170.2 cm)   Physical Exam  Constitutional:       General: He is not in acute distress.     Appearance: He is well-developed.   HENT:      Head: Normocephalic and atraumatic.   Eyes:      General: Lids are normal. No scleral icterus.     Conjunctiva/sclera: Conjunctivae normal.      Pupils: Pupils are equal, round, and " reactive to light.   Neck:      Musculoskeletal: Full passive range of motion without pain and neck supple.      Thyroid: No thyromegaly.      Vascular: No carotid bruit or JVD.   Cardiovascular:      Rate and Rhythm: Normal rate and regular rhythm.      Heart sounds: Normal heart sounds. No murmur. No friction rub. No S3 or S4 sounds.    Pulmonary:      Effort: Pulmonary effort is normal.      Breath sounds: Normal breath sounds. No wheezing, rhonchi or rales.   Abdominal:      General: Bowel sounds are normal. There is no distension.      Palpations: Abdomen is soft.      Tenderness: There is no abdominal tenderness.   Musculoskeletal:         General: No tenderness.      Right lower leg: No edema.      Left lower leg: No edema.   Skin:     General: Skin is warm and dry.      Findings: No rash.   Neurological:      Mental Status: He is alert and oriented to person, place, and time.   Psychiatric:         Speech: Speech normal.         Behavior: Behavior normal.         Thought Content: Thought content normal.           Health Maintenance       Date Due Completion Date    Pneumococcal Vaccine (Medium Risk) (1 of 1 - PPSV23) 07/05/1980 ---    Shingles Vaccine (1 of 2) 07/05/2011 ---    Influenza Vaccine (1) 08/01/2020 ---    Colorectal Cancer Screening 09/06/2020 9/6/2019    Lipid Panel 09/08/2025 9/8/2020    TETANUS VACCINE 08/28/2028 8/28/2018

## 2020-09-21 NOTE — ASSESSMENT & PLAN NOTE
Restart statin.  Counseled that his CVD risk score would drop under the cutoff for statin if he quit smoking.

## 2020-09-21 NOTE — ASSESSMENT & PLAN NOTE
I counseled the patient on smoking cessation, risks associated with smoking, having a quit date, nicotine replacement, medications that can aid in cessation, and having a plan for future cravings.  The patient stated that he is not interested in smoking cessation clinic at this time.

## 2020-09-21 NOTE — PATIENT INSTRUCTIONS
Restart the pravastatin.      You can take 2 trazodone at night.     We are going to stop the lexapro and use Zoloft instead because this has better appetite results.

## 2020-09-22 ENCOUNTER — LAB VISIT (OUTPATIENT)
Dept: LAB | Facility: HOSPITAL | Age: 60
End: 2020-09-22
Attending: INTERNAL MEDICINE
Payer: COMMERCIAL

## 2020-09-22 DIAGNOSIS — Z12.11 COLON CANCER SCREENING: ICD-10-CM

## 2020-09-22 PROCEDURE — 82274 ASSAY TEST FOR BLOOD FECAL: CPT

## 2020-09-28 LAB — HEMOCCULT STL QL IA: NEGATIVE

## 2020-09-28 NOTE — PROGRESS NOTES
Your stool results are normal.  Please continue your current medications and doses.  Please feel free to contact me with any questions or concerns.    Sincerely,  Delio Cruz  [email or web address]

## 2020-10-19 ENCOUNTER — OFFICE VISIT (OUTPATIENT)
Dept: FAMILY MEDICINE | Facility: CLINIC | Age: 60
End: 2020-10-19
Payer: COMMERCIAL

## 2020-10-19 VITALS
DIASTOLIC BLOOD PRESSURE: 70 MMHG | HEIGHT: 67 IN | OXYGEN SATURATION: 97 % | HEART RATE: 96 BPM | TEMPERATURE: 97 F | WEIGHT: 144.94 LBS | BODY MASS INDEX: 22.75 KG/M2 | SYSTOLIC BLOOD PRESSURE: 120 MMHG

## 2020-10-19 DIAGNOSIS — E78.2 MIXED HYPERLIPIDEMIA: Chronic | ICD-10-CM

## 2020-10-19 DIAGNOSIS — R63.4 UNINTENTIONAL WEIGHT LOSS OF MORE THAN 10 POUNDS: Primary | ICD-10-CM

## 2020-10-19 PROCEDURE — 99999 PR PBB SHADOW E&M-EST. PATIENT-LVL III: ICD-10-PCS | Mod: PBBFAC,,, | Performed by: INTERNAL MEDICINE

## 2020-10-19 PROCEDURE — 99214 PR OFFICE/OUTPT VISIT, EST, LEVL IV, 30-39 MIN: ICD-10-PCS | Mod: S$GLB,,, | Performed by: INTERNAL MEDICINE

## 2020-10-19 PROCEDURE — 3008F BODY MASS INDEX DOCD: CPT | Mod: CPTII,S$GLB,, | Performed by: INTERNAL MEDICINE

## 2020-10-19 PROCEDURE — 99214 OFFICE O/P EST MOD 30 MIN: CPT | Mod: S$GLB,,, | Performed by: INTERNAL MEDICINE

## 2020-10-19 PROCEDURE — 3008F PR BODY MASS INDEX (BMI) DOCUMENTED: ICD-10-PCS | Mod: CPTII,S$GLB,, | Performed by: INTERNAL MEDICINE

## 2020-10-19 PROCEDURE — 99999 PR PBB SHADOW E&M-EST. PATIENT-LVL III: CPT | Mod: PBBFAC,,, | Performed by: INTERNAL MEDICINE

## 2020-10-19 RX ORDER — PRAVASTATIN SODIUM 20 MG/1
20 TABLET ORAL DAILY
Qty: 90 TABLET | Refills: 3 | Status: SHIPPED | OUTPATIENT
Start: 2020-10-19

## 2020-10-19 NOTE — PROGRESS NOTES
"Assessment & Plan  Problem List Items Addressed This Visit        Cardiac/Vascular    Mixed hyperlipidemia (Chronic)    Current Assessment & Plan     Counseled on need to restart for CVD risk reduction.          Relevant Medications    pravastatin (PRAVACHOL) 20 MG tablet      Other Visit Diagnoses     Unintentional weight loss of more than 10 pounds    -  Primary  -    Check CT chest/abdo/pelvis.  Unclear cause at this time.  Discussed that his BMI is "normal" but the continued decline is what is concerning.     Relevant Orders    CT Chest Abdomen Pelvis W W/O Contrast (XPD)    Basic Metabolic Panel            Health Maintenance reviewed, will look into shingrix cost.    Follow-up: Follow up pending CT results.    ______________________________________________________________________    Chief Complaint  Chief Complaint   Patient presents with    Weight Loss    Depression    Follow-up       HPI  Chi Alvarado Jr. is a 60 y.o. male with multiple medical diagnoses as listed in the medical history and problem list that presents for weight loss depression follow up.  Pt is known to me with his last appointment 9/21/2020.      He reports that he isn't taking any of his medications.  Reports that his mood and sleep are doing ok off of the meds.  Things are steadily improving with mood and sleep. .  No concerns for side effects but just doesn't like to take medications in generally.      Weight is down another 3-4#.  Appetite stable.  + early satiety compared to previous.  No resp symptoms.  Significant smoking history.  FitKit was negative.     The 10-year ASCVD risk score (Wyarno DONOVAN Jr., et al., 2013) is: 7.8%    Values used to calculate the score:      Age: 60 years      Sex: Male      Is Non- : No      Diabetic: No      Tobacco smoker: Yes      Systolic Blood Pressure: 120 mmHg      Is BP treated: No      HDL Cholesterol: 57 mg/dL      Total Cholesterol: 144 mg/dL    PAST MEDICAL " HISTORY:  Past Medical History:   Diagnosis Date    Alcohol consumption heavy 10/6/2014    C1 cervical fracture     History of hepatitis C, s/p successful Rx w/ SVR24 (cure) - 7/2019 9/9/2014    S/p Epclusa w/ SVR    Mandible fracture     Mixed hyperlipidemia 8/31/2018       PAST SURGICAL HISTORY:  Past Surgical History:   Procedure Laterality Date    MANDIBLE FRACTURE SURGERY         SOCIAL HISTORY:  Social History     Socioeconomic History    Marital status:      Spouse name: Not on file    Number of children: Not on file    Years of education: Not on file    Highest education level: Not on file   Occupational History     Employer: [employer]   Social Needs    Financial resource strain: Not on file    Food insecurity     Worry: Not on file     Inability: Not on file    Transportation needs     Medical: Not on file     Non-medical: Not on file   Tobacco Use    Smoking status: Current Every Day Smoker     Packs/day: 0.50     Years: 41.00     Pack years: 20.50    Smokeless tobacco: Never Used   Substance and Sexual Activity    Alcohol use: Yes     Comment: weekends 6 beers a day    Drug use: No    Sexual activity: Yes     Partners: Female     Comment:    Lifestyle    Physical activity     Days per week: Not on file     Minutes per session: Not on file    Stress: Not on file   Relationships    Social connections     Talks on phone: Not on file     Gets together: Not on file     Attends Bahai service: Not on file     Active member of club or organization: Not on file     Attends meetings of clubs or organizations: Not on file     Relationship status: Not on file   Other Topics Concern    Not on file   Social History Narrative    Not on file       FAMILY HISTORY:  Family History   Problem Relation Age of Onset    Breast cancer Mother     Lung cancer Mother     Heart disease Father     Heart attack Father        ALLERGIES AND MEDICATIONS: updated and  reviewed.  Review of patient's allergies indicates:  No Known Allergies  Current Outpatient Medications   Medication Sig Dispense Refill    meloxicam (MOBIC) 15 MG tablet TK 1 T PO QD WITH A MEAL  3    pravastatin (PRAVACHOL) 20 MG tablet Take 1 tablet (20 mg total) by mouth once daily. 90 tablet 3    sertraline (ZOLOFT) 100 MG tablet Take 1 tablet (100 mg total) by mouth once daily. Crack the 1st 3 tabs in half and take half tab daily for 6 days then increase to full tab (Patient not taking: Reported on 10/19/2020) 90 tablet 0    silver sulfADIAZINE 1% (SILVADENE) 1 % cream Apply topically.      traZODone (DESYREL) 50 MG tablet Take 1 tablet (50 mg total) by mouth every evening. (Patient not taking: Reported on 9/21/2020) 90 tablet 1     No current facility-administered medications for this visit.          ROS  Review of Systems   Constitutional: Positive for unexpected weight change. Negative for chills and fever.   HENT: Negative for congestion, dental problem, ear pain, hearing loss, rhinorrhea, sore throat and trouble swallowing.    Eyes: Negative for discharge, redness and visual disturbance.   Respiratory: Negative for cough, chest tightness, shortness of breath and wheezing.    Cardiovascular: Negative for chest pain, palpitations and leg swelling.   Gastrointestinal: Negative for abdominal pain, constipation, diarrhea, nausea and vomiting.   Endocrine: Negative for polydipsia, polyphagia and polyuria.   Genitourinary: Negative for decreased urine volume, dysuria and hematuria.   Musculoskeletal: Negative for arthralgias and myalgias.   Skin: Negative for color change and rash.   Neurological: Negative for dizziness, weakness, light-headedness and headaches.   Psychiatric/Behavioral: Positive for dysphoric mood and sleep disturbance. Negative for decreased concentration and suicidal ideas.           Physical Exam  Vitals:    10/19/20 1005   BP: 120/70   Pulse: 96   Temp: 97.4 °F (36.3 °C)   SpO2: 97%  "  Weight: 65.8 kg (144 lb 15.2 oz)   Height: 5' 7" (1.702 m)    Body mass index is 22.7 kg/m².  Weight: 65.8 kg (144 lb 15.2 oz)   Height: 5' 7" (170.2 cm)   Physical Exam      Health Maintenance       Date Due Completion Date    Shingles Vaccine (1 of 2) 07/05/2011 ---    Colorectal Cancer Screening 09/28/2021 9/28/2020    Lipid Panel 09/08/2025 9/8/2020    TETANUS VACCINE 08/04/2030 8/4/2020              "

## 2020-10-27 ENCOUNTER — PATIENT MESSAGE (OUTPATIENT)
Dept: FAMILY MEDICINE | Facility: CLINIC | Age: 60
End: 2020-10-27

## 2020-10-27 ENCOUNTER — HOSPITAL ENCOUNTER (OUTPATIENT)
Dept: RADIOLOGY | Facility: HOSPITAL | Age: 60
Discharge: HOME | End: 2020-10-27
Attending: INTERNAL MEDICINE
Payer: COMMERCIAL

## 2020-10-27 DIAGNOSIS — R63.4 UNINTENTIONAL WEIGHT LOSS OF MORE THAN 10 POUNDS: ICD-10-CM

## 2020-10-27 PROCEDURE — 25500020 PHARM REV CODE 255: Performed by: INTERNAL MEDICINE

## 2020-10-27 PROCEDURE — 71260 CT CHEST ABDOMEN PELVIS WITH CONTRAST (XPD): ICD-10-PCS | Mod: 26,,, | Performed by: RADIOLOGY

## 2020-10-27 PROCEDURE — 74177 CT ABD & PELVIS W/CONTRAST: CPT | Mod: 26,,, | Performed by: RADIOLOGY

## 2020-10-27 PROCEDURE — 74177 CT CHEST ABDOMEN PELVIS WITH CONTRAST (XPD): ICD-10-PCS | Mod: 26,,, | Performed by: RADIOLOGY

## 2020-10-27 PROCEDURE — 74177 CT ABD & PELVIS W/CONTRAST: CPT | Mod: TC

## 2020-10-27 PROCEDURE — 71260 CT THORAX DX C+: CPT | Mod: 26,,, | Performed by: RADIOLOGY

## 2020-10-27 RX ADMIN — IOHEXOL 75 ML: 350 INJECTION, SOLUTION INTRAVENOUS at 08:10

## 2021-02-12 ENCOUNTER — TELEPHONE (OUTPATIENT)
Dept: OPHTHALMOLOGY | Facility: CLINIC | Age: 61
End: 2021-02-12

## 2021-04-05 ENCOUNTER — PATIENT MESSAGE (OUTPATIENT)
Dept: RESEARCH | Facility: HOSPITAL | Age: 61
End: 2021-04-05

## 2021-07-01 ENCOUNTER — PATIENT MESSAGE (OUTPATIENT)
Dept: ADMINISTRATIVE | Facility: OTHER | Age: 61
End: 2021-07-01

## 2021-07-02 ENCOUNTER — PATIENT MESSAGE (OUTPATIENT)
Dept: ADMINISTRATIVE | Facility: HOSPITAL | Age: 61
End: 2021-07-02

## 2021-07-12 ENCOUNTER — LAB VISIT (OUTPATIENT)
Dept: FAMILY MEDICINE | Facility: CLINIC | Age: 61
End: 2021-07-12
Payer: COMMERCIAL

## 2021-07-12 ENCOUNTER — OFFICE VISIT (OUTPATIENT)
Dept: FAMILY MEDICINE | Facility: CLINIC | Age: 61
End: 2021-07-12
Payer: COMMERCIAL

## 2021-07-12 VITALS
WEIGHT: 143.88 LBS | DIASTOLIC BLOOD PRESSURE: 64 MMHG | SYSTOLIC BLOOD PRESSURE: 116 MMHG | HEIGHT: 66 IN | OXYGEN SATURATION: 99 % | TEMPERATURE: 98 F | BODY MASS INDEX: 23.12 KG/M2 | HEART RATE: 104 BPM

## 2021-07-12 DIAGNOSIS — Z12.11 SCREENING FOR COLON CANCER: ICD-10-CM

## 2021-07-12 DIAGNOSIS — R05.9 COUGH: ICD-10-CM

## 2021-07-12 DIAGNOSIS — J30.9 ALLERGIC SINUSITIS: Primary | ICD-10-CM

## 2021-07-12 DIAGNOSIS — Z00.00 ROUTINE MEDICAL EXAM: ICD-10-CM

## 2021-07-12 PROCEDURE — 99999 PR PBB SHADOW E&M-EST. PATIENT-LVL III: ICD-10-PCS | Mod: PBBFAC,,, | Performed by: INTERNAL MEDICINE

## 2021-07-12 PROCEDURE — 3008F BODY MASS INDEX DOCD: CPT | Mod: CPTII,S$GLB,, | Performed by: INTERNAL MEDICINE

## 2021-07-12 PROCEDURE — 99214 OFFICE O/P EST MOD 30 MIN: CPT | Mod: S$GLB,,, | Performed by: INTERNAL MEDICINE

## 2021-07-12 PROCEDURE — 99999 PR PBB SHADOW E&M-EST. PATIENT-LVL III: CPT | Mod: PBBFAC,,, | Performed by: INTERNAL MEDICINE

## 2021-07-12 PROCEDURE — 3008F PR BODY MASS INDEX (BMI) DOCUMENTED: ICD-10-PCS | Mod: CPTII,S$GLB,, | Performed by: INTERNAL MEDICINE

## 2021-07-12 PROCEDURE — 1126F PR PAIN SEVERITY QUANTIFIED, NO PAIN PRESENT: ICD-10-PCS | Mod: S$GLB,,, | Performed by: INTERNAL MEDICINE

## 2021-07-12 PROCEDURE — U0005 INFEC AGEN DETEC AMPLI PROBE: HCPCS | Performed by: INTERNAL MEDICINE

## 2021-07-12 PROCEDURE — 1126F AMNT PAIN NOTED NONE PRSNT: CPT | Mod: S$GLB,,, | Performed by: INTERNAL MEDICINE

## 2021-07-12 PROCEDURE — 99214 PR OFFICE/OUTPT VISIT, EST, LEVL IV, 30-39 MIN: ICD-10-PCS | Mod: S$GLB,,, | Performed by: INTERNAL MEDICINE

## 2021-07-12 PROCEDURE — U0003 INFECTIOUS AGENT DETECTION BY NUCLEIC ACID (DNA OR RNA); SEVERE ACUTE RESPIRATORY SYNDROME CORONAVIRUS 2 (SARS-COV-2) (CORONAVIRUS DISEASE [COVID-19]), AMPLIFIED PROBE TECHNIQUE, MAKING USE OF HIGH THROUGHPUT TECHNOLOGIES AS DESCRIBED BY CMS-2020-01-R: HCPCS | Performed by: INTERNAL MEDICINE

## 2021-07-12 RX ORDER — ESCITALOPRAM OXALATE 10 MG/1
TABLET ORAL
COMMUNITY
Start: 2021-04-29

## 2021-07-12 RX ORDER — LEVOCETIRIZINE DIHYDROCHLORIDE 5 MG/1
5 TABLET, FILM COATED ORAL NIGHTLY
Qty: 90 TABLET | Refills: 0 | Status: SHIPPED | OUTPATIENT
Start: 2021-07-12 | End: 2022-07-12

## 2021-07-12 RX ORDER — FLUTICASONE PROPIONATE 50 MCG
1 SPRAY, SUSPENSION (ML) NASAL DAILY
Qty: 16 G | Refills: 0 | Status: SHIPPED | OUTPATIENT
Start: 2021-07-12 | End: 2022-11-30

## 2021-07-13 ENCOUNTER — TELEPHONE (OUTPATIENT)
Dept: FAMILY MEDICINE | Facility: CLINIC | Age: 61
End: 2021-07-13

## 2021-07-13 LAB
SARS-COV-2 RNA RESP QL NAA+PROBE: NOT DETECTED
SARS-COV-2- CYCLE NUMBER: -1

## 2021-12-03 ENCOUNTER — PATIENT MESSAGE (OUTPATIENT)
Dept: ENDOSCOPY | Facility: HOSPITAL | Age: 61
End: 2021-12-03
Payer: COMMERCIAL

## 2021-12-03 DIAGNOSIS — Z12.11 SPECIAL SCREENING FOR MALIGNANT NEOPLASMS, COLON: Primary | ICD-10-CM

## 2021-12-03 RX ORDER — POLYETHYLENE GLYCOL 3350, SODIUM SULFATE ANHYDROUS, SODIUM BICARBONATE, SODIUM CHLORIDE, POTASSIUM CHLORIDE 236; 22.74; 6.74; 5.86; 2.97 G/4L; G/4L; G/4L; G/4L; G/4L
4 POWDER, FOR SOLUTION ORAL ONCE
Qty: 4000 ML | Refills: 0 | Status: SHIPPED | OUTPATIENT
Start: 2021-12-03 | End: 2021-12-03

## 2021-12-30 DIAGNOSIS — Z01.818 PRE-OP TESTING: ICD-10-CM

## 2022-04-20 ENCOUNTER — OFFICE VISIT (OUTPATIENT)
Dept: URGENT CARE | Facility: CLINIC | Age: 62
End: 2022-04-20
Payer: COMMERCIAL

## 2022-04-20 VITALS
DIASTOLIC BLOOD PRESSURE: 91 MMHG | HEIGHT: 66 IN | SYSTOLIC BLOOD PRESSURE: 163 MMHG | HEART RATE: 79 BPM | OXYGEN SATURATION: 98 % | RESPIRATION RATE: 18 BRPM | TEMPERATURE: 99 F | BODY MASS INDEX: 22.98 KG/M2 | WEIGHT: 143 LBS

## 2022-04-20 DIAGNOSIS — R09.81 SINUS CONGESTION: ICD-10-CM

## 2022-04-20 DIAGNOSIS — M25.572 ACUTE LEFT ANKLE PAIN: Primary | ICD-10-CM

## 2022-04-20 DIAGNOSIS — J01.91 ACUTE RECURRENT SINUSITIS, UNSPECIFIED LOCATION: ICD-10-CM

## 2022-04-20 PROCEDURE — 1159F PR MEDICATION LIST DOCUMENTED IN MEDICAL RECORD: ICD-10-PCS | Mod: CPTII,S$GLB,, | Performed by: PHYSICIAN ASSISTANT

## 2022-04-20 PROCEDURE — 3077F PR MOST RECENT SYSTOLIC BLOOD PRESSURE >= 140 MM HG: ICD-10-PCS | Mod: CPTII,S$GLB,, | Performed by: PHYSICIAN ASSISTANT

## 2022-04-20 PROCEDURE — 1160F PR REVIEW ALL MEDS BY PRESCRIBER/CLIN PHARMACIST DOCUMENTED: ICD-10-PCS | Mod: CPTII,S$GLB,, | Performed by: PHYSICIAN ASSISTANT

## 2022-04-20 PROCEDURE — 99214 OFFICE O/P EST MOD 30 MIN: CPT | Mod: S$GLB,,, | Performed by: PHYSICIAN ASSISTANT

## 2022-04-20 PROCEDURE — 3080F DIAST BP >= 90 MM HG: CPT | Mod: CPTII,S$GLB,, | Performed by: PHYSICIAN ASSISTANT

## 2022-04-20 PROCEDURE — 73610 XR ANKLE COMPLETE 3 VIEW LEFT: ICD-10-PCS | Mod: LT,S$GLB,, | Performed by: RADIOLOGY

## 2022-04-20 PROCEDURE — 99214 PR OFFICE/OUTPT VISIT, EST, LEVL IV, 30-39 MIN: ICD-10-PCS | Mod: S$GLB,,, | Performed by: PHYSICIAN ASSISTANT

## 2022-04-20 PROCEDURE — 1159F MED LIST DOCD IN RCRD: CPT | Mod: CPTII,S$GLB,, | Performed by: PHYSICIAN ASSISTANT

## 2022-04-20 PROCEDURE — 3008F BODY MASS INDEX DOCD: CPT | Mod: CPTII,S$GLB,, | Performed by: PHYSICIAN ASSISTANT

## 2022-04-20 PROCEDURE — 3008F PR BODY MASS INDEX (BMI) DOCUMENTED: ICD-10-PCS | Mod: CPTII,S$GLB,, | Performed by: PHYSICIAN ASSISTANT

## 2022-04-20 PROCEDURE — 3077F SYST BP >= 140 MM HG: CPT | Mod: CPTII,S$GLB,, | Performed by: PHYSICIAN ASSISTANT

## 2022-04-20 PROCEDURE — 3080F PR MOST RECENT DIASTOLIC BLOOD PRESSURE >= 90 MM HG: ICD-10-PCS | Mod: CPTII,S$GLB,, | Performed by: PHYSICIAN ASSISTANT

## 2022-04-20 PROCEDURE — 73610 X-RAY EXAM OF ANKLE: CPT | Mod: LT,S$GLB,, | Performed by: RADIOLOGY

## 2022-04-20 PROCEDURE — 1160F RVW MEDS BY RX/DR IN RCRD: CPT | Mod: CPTII,S$GLB,, | Performed by: PHYSICIAN ASSISTANT

## 2022-04-20 RX ORDER — NAPROXEN 500 MG/1
500 TABLET ORAL 2 TIMES DAILY WITH MEALS
Qty: 20 TABLET | Refills: 1 | Status: SHIPPED | OUTPATIENT
Start: 2022-04-20

## 2022-04-20 RX ORDER — FLUTICASONE PROPIONATE 50 MCG
1 SPRAY, SUSPENSION (ML) NASAL DAILY
Qty: 16 G | Refills: 3 | Status: SHIPPED | OUTPATIENT
Start: 2022-04-20 | End: 2022-11-30

## 2022-04-20 RX ORDER — CETIRIZINE HYDROCHLORIDE 10 MG/1
TABLET ORAL
Qty: 90 TABLET | Refills: 1 | Status: SHIPPED | OUTPATIENT
Start: 2022-04-20 | End: 2023-12-01 | Stop reason: SDUPTHER

## 2022-04-20 RX ORDER — CETIRIZINE HYDROCHLORIDE 10 MG/1
10 TABLET ORAL DAILY
Qty: 30 TABLET | Refills: 1 | Status: SHIPPED | OUTPATIENT
Start: 2022-04-20 | End: 2022-04-20

## 2022-04-20 RX ORDER — CROMOLYN SODIUM 5.2 MG/ML
1 SPRAY, METERED NASAL 4 TIMES DAILY
Qty: 26 ML | Refills: 1 | Status: SHIPPED | OUTPATIENT
Start: 2022-04-20

## 2022-04-21 NOTE — PROGRESS NOTES
"Subjective:       Patient ID: Chi Alvarado Jr. is a 61 y.o. male.    Vitals:  height is 5' 6" (1.676 m) and weight is 64.9 kg (143 lb). His temperature is 98.8 °F (37.1 °C). His blood pressure is 163/91 (abnormal) and his pulse is 79. His respiration is 18 and oxygen saturation is 98%.     Chief Complaint: Ankle Injury    Pt states that he fell off a ladder while working on his home and injured his left ankle on 4/19 . Pain level at 5 , pt can not bear full weight and ankle is swollen . Pt states that he is taking tylenol   .  Patient states he twisted his left ankle when he came off of a ladder yesterday.  He is able to ambulate but is painful.  He also complains of sinus congestion    Ankle Injury   The incident occurred 2 days ago. The incident occurred at home. The injury mechanism was a fall and a twisting injury. The pain is present in the left ankle. The quality of the pain is described as aching. The pain is at a severity of 5/10. The pain is moderate. The pain has been intermittent since onset. Associated symptoms include an inability to bear weight. He reports no foreign bodies present. The symptoms are aggravated by movement and weight bearing. He has tried acetaminophen for the symptoms. The treatment provided mild relief.       HENT: Positive for postnasal drip, sinus pain and sinus pressure.    Musculoskeletal: Positive for pain and trauma.   Skin: Negative for erythema.       Objective:      Physical Exam   Constitutional: He is oriented to person, place, and time. He appears well-developed. He is cooperative.  Non-toxic appearance. He does not appear ill. No distress.   HENT:   Head: Normocephalic and atraumatic.   Ears:   Right Ear: Hearing, tympanic membrane, external ear and ear canal normal.   Left Ear: Hearing, tympanic membrane, external ear and ear canal normal.   Nose: Nose normal. No mucosal edema, rhinorrhea, nasal deformity or congestion. No epistaxis. Right sinus exhibits no " maxillary sinus tenderness and no frontal sinus tenderness. Left sinus exhibits no maxillary sinus tenderness and no frontal sinus tenderness.   Mouth/Throat: Uvula is midline, oropharynx is clear and moist and mucous membranes are normal. No trismus in the jaw. Normal dentition. No uvula swelling. No oropharyngeal exudate, posterior oropharyngeal edema or posterior oropharyngeal erythema.   Eyes: Conjunctivae and lids are normal. No scleral icterus.   Neck: Trachea normal and phonation normal. Neck supple. No edema present. No erythema present. No neck rigidity present.   Cardiovascular: Normal rate, regular rhythm, normal heart sounds and normal pulses.   Pulmonary/Chest: Effort normal and breath sounds normal. No respiratory distress. He has no decreased breath sounds. He has no rhonchi.   Abdominal: Normal appearance.   Musculoskeletal: Normal range of motion.         General: Swelling and tenderness present. No deformity. Normal range of motion.        Feet:    Neurological: He is alert and oriented to person, place, and time. He exhibits normal muscle tone. Coordination normal.   Skin: Skin is warm, dry, intact, not diaphoretic, not pale and no rash. Capillary refill takes less than 2 seconds. No erythema   Psychiatric: His speech is normal and behavior is normal. Judgment and thought content normal.   Nursing note and vitals reviewed.  X-RAY INTERPRETED BY ME  I PERSONALLY REVIEWED X-RAY AND SEE NO ACUTE PROCESS NO FRACTURE.      Assessment:       1. Acute left ankle pain    2. Acute recurrent sinusitis, unspecified location    3. Sinus congestion          Plan:         Acute left ankle pain  -     XR ANKLE COMPLETE 3 VIEW LEFT; Future; Expected date: 04/20/2022  -     naproxen (NAPROSYN) 500 MG tablet; Take 1 tablet (500 mg total) by mouth 2 (two) times daily with meals.  Dispense: 20 tablet; Refill: 1    Acute recurrent sinusitis, unspecified location  -     fluticasone propionate (FLONASE) 50 mcg/actuation  nasal spray; 1 spray (50 mcg total) by Each Nostril route once daily.  Dispense: 16 g; Refill: 3  -     cromolyn (NASALCHROM) 5.2 mg/spray (4 %) nasal spray; 1 spray by Nasal route 4 (four) times daily.  Dispense: 26 mL; Refill: 1    Sinus congestion  -     cetirizine (ZYRTEC) 10 MG tablet; Take 1 tablet (10 mg total) by mouth once daily.  Dispense: 30 tablet; Refill: 1    Follow up if symptoms worsen or fail to improve, for F/U with PCP or ED. There are no Patient Instructions on file for this visit.

## 2022-11-30 ENCOUNTER — OFFICE VISIT (OUTPATIENT)
Dept: URGENT CARE | Facility: CLINIC | Age: 62
End: 2022-11-30
Payer: COMMERCIAL

## 2022-11-30 VITALS
OXYGEN SATURATION: 97 % | WEIGHT: 142 LBS | SYSTOLIC BLOOD PRESSURE: 158 MMHG | HEIGHT: 66 IN | HEART RATE: 67 BPM | BODY MASS INDEX: 22.82 KG/M2 | DIASTOLIC BLOOD PRESSURE: 83 MMHG | TEMPERATURE: 99 F | RESPIRATION RATE: 18 BRPM

## 2022-11-30 DIAGNOSIS — J01.90 ACUTE SINUSITIS, RECURRENCE NOT SPECIFIED, UNSPECIFIED LOCATION: Primary | ICD-10-CM

## 2022-11-30 LAB
CTP QC/QA: YES
POC MOLECULAR INFLUENZA A AGN: NEGATIVE
POC MOLECULAR INFLUENZA B AGN: NEGATIVE

## 2022-11-30 PROCEDURE — 99213 OFFICE O/P EST LOW 20 MIN: CPT | Mod: S$GLB,,,

## 2022-11-30 PROCEDURE — 3079F DIAST BP 80-89 MM HG: CPT | Mod: CPTII,S$GLB,,

## 2022-11-30 PROCEDURE — 3008F BODY MASS INDEX DOCD: CPT | Mod: CPTII,S$GLB,,

## 2022-11-30 PROCEDURE — 3077F SYST BP >= 140 MM HG: CPT | Mod: CPTII,S$GLB,,

## 2022-11-30 PROCEDURE — 1159F PR MEDICATION LIST DOCUMENTED IN MEDICAL RECORD: ICD-10-PCS | Mod: CPTII,S$GLB,,

## 2022-11-30 PROCEDURE — 99213 PR OFFICE/OUTPT VISIT, EST, LEVL III, 20-29 MIN: ICD-10-PCS | Mod: S$GLB,,,

## 2022-11-30 PROCEDURE — 3077F PR MOST RECENT SYSTOLIC BLOOD PRESSURE >= 140 MM HG: ICD-10-PCS | Mod: CPTII,S$GLB,,

## 2022-11-30 PROCEDURE — 1160F PR REVIEW ALL MEDS BY PRESCRIBER/CLIN PHARMACIST DOCUMENTED: ICD-10-PCS | Mod: CPTII,S$GLB,,

## 2022-11-30 PROCEDURE — 1159F MED LIST DOCD IN RCRD: CPT | Mod: CPTII,S$GLB,,

## 2022-11-30 PROCEDURE — 87502 POCT INFLUENZA A/B MOLECULAR: ICD-10-PCS | Mod: QW,S$GLB,,

## 2022-11-30 PROCEDURE — 1160F RVW MEDS BY RX/DR IN RCRD: CPT | Mod: CPTII,S$GLB,,

## 2022-11-30 PROCEDURE — 3008F PR BODY MASS INDEX (BMI) DOCUMENTED: ICD-10-PCS | Mod: CPTII,S$GLB,,

## 2022-11-30 PROCEDURE — 87502 INFLUENZA DNA AMP PROBE: CPT | Mod: QW,S$GLB,,

## 2022-11-30 PROCEDURE — 3079F PR MOST RECENT DIASTOLIC BLOOD PRESSURE 80-89 MM HG: ICD-10-PCS | Mod: CPTII,S$GLB,,

## 2022-11-30 RX ORDER — AZELASTINE 1 MG/ML
1 SPRAY, METERED NASAL 2 TIMES DAILY
Qty: 30 ML | Refills: 0 | Status: SHIPPED | OUTPATIENT
Start: 2022-11-30

## 2022-11-30 RX ORDER — FLUTICASONE PROPIONATE 50 MCG
1 SPRAY, SUSPENSION (ML) NASAL DAILY
Qty: 15.8 ML | Refills: 0 | Status: SHIPPED | OUTPATIENT
Start: 2022-11-30

## 2022-11-30 NOTE — PATIENT INSTRUCTIONS
- Rest.    - Drink plenty of fluids.     - You can take over-the-counter claritin, zyrtec, allegra, or xyzal as directed. These are antihistamines that can help with runny nose, nasal congestion, sneezing, and helps to dry up post-nasal drip, which usually causes sore throat and cough.              - If you do NOT have high blood pressure, you may use a decongestant form (D)  of this medication (ie. Claritin- D, zyrtec-D, allegra-D) or if you do not take the D form, you can take sudafed (pseudoephedrine) over the counter, which is a decongestant. Do NOT take two decongestant (D) medications at the same time (such as mucinex-D and claritin-D or plain sudafed and claritin D)    - If you DO have Hypertension, anxiety, or palpitations, it is safe to take Coricidin HBP for relief of sinus symptoms.     - You can use Flonase (fluticasone) nasal spray as directed for sinus congestion and postnasal drip. This is a steroid nasal spray that works locally over time to decrease the inflammation in your nose/sinuses and help with allergic symptoms. This is not an quick- relief spray like afrin, but it works well if used daily.  Discontinue if you develop nose bleed  - use nasal saline prior to Flonase.  - Use Ocean Spray Nasal Saline 1-3 puffs each nostril every 2-3 hours then blow out onto tissue. This is to irrigate the nasal passage way to clear the sinus openings. Use until sinus problem resolved.     - you can take plain Mucinex (guaifenesin) 1200 mg twice a day to help loosen mucous.      -warm salt water gargles can help with sore throat     - warm tea with honey can help with cough. Honey is a natural cough suppressant.     - Dextromethorphan (DM) is a cough suppressant over the counter (ie. mucinex DM, robitussin, delsym; dayquil/nyquil has DM as well.)        - Follow up with your PCP or specialty clinic as directed in the next 1-2 weeks if not improved or as needed.  You can call [phone] to schedule an  appointment with the appropriate provider.       - Go to the ER if you develop new or worsening symptoms.      - You must understand that you have received an Urgent Care treatment only and that you may be released before all of your medical problems are known or treated.   - You, the patient, will arrange for follow up care as instructed.   - If your condition worsens or fails to improve we recommend that you receive another evaluation at the ER immediately or contact your PCP to discuss your concerns or return here.

## 2022-11-30 NOTE — PROGRESS NOTES
"Subjective:       Patient ID: Chi Alvarado Jr. is a 62 y.o. male.    Vitals:  height is 5' 6" (1.676 m) and weight is 64.4 kg (142 lb). His tympanic temperature is 98.5 °F (36.9 °C). His blood pressure is 158/83 (abnormal) and his pulse is 67. His respiration is 18 and oxygen saturation is 97%.     Chief Complaint: Sinus Problem    Patient is a 62-year-old male who presents with sinus congestion, postnasal drip x3 days.  He had a sore throat initially but that has resolved.  Has been using DayQuil and NyQuil.  Reports that he has been having recurrent sinus issues for the past 3 years.  Does follow with ENT, and states he is scheduled to get Sunday coughing.  Denies any fever, chills, headaches, chest pain, shortness of breath, dizziness, nausea, vomiting. Had a negative at-home COVID test.    Sinus Problem  This is a new problem. The current episode started in the past 7 days (2 days). The problem is unchanged. There has been no fever. His pain is at a severity of 2/10. The pain is mild. Associated symptoms include congestion and sinus pressure. Pertinent negatives include no chills, coughing, diaphoresis, ear pain, headaches, hoarse voice, neck pain, shortness of breath, sneezing, sore throat or swollen glands. Past treatments include oral decongestants. The treatment provided mild relief.     Constitution: Negative for chills and sweating.   HENT:  Positive for congestion, postnasal drip and sinus pressure. Negative for ear pain and sore throat.    Neck: Negative for neck pain.   Cardiovascular:  Negative for chest pain.   Eyes:  Negative for eye discharge and eye itching.   Respiratory:  Negative for cough and shortness of breath.    Gastrointestinal:  Negative for abdominal pain, nausea, vomiting and diarrhea.   Musculoskeletal:  Negative for muscle ache.   Skin:  Negative for rash.   Allergic/Immunologic: Negative for sneezing.   Neurological:  Negative for dizziness and headaches.     Objective:    "   Physical Exam   Constitutional: He is oriented to person, place, and time. He appears well-developed.   HENT:   Head: Normocephalic and atraumatic.   Ears:   Right Ear: Tympanic membrane, external ear and ear canal normal.   Left Ear: Tympanic membrane, external ear and ear canal normal.   Nose: Nose normal.   Mouth/Throat: Oropharynx is clear and moist. Mucous membranes are moist. Oropharynx is clear.   Eyes: Conjunctivae, EOM and lids are normal. Pupils are equal, round, and reactive to light.   Neck: Trachea normal and phonation normal. Neck supple.   Cardiovascular: Normal rate, regular rhythm, normal heart sounds and normal pulses.   Pulmonary/Chest: Effort normal and breath sounds normal. No respiratory distress.   Musculoskeletal: Normal range of motion.         General: Normal range of motion.   Neurological: no focal deficit. He is alert and oriented to person, place, and time.   Skin: Skin is warm, dry and intact. Capillary refill takes less than 2 seconds.   Psychiatric: His speech is normal and behavior is normal. Judgment and thought content normal.   Nursing note and vitals reviewed.      Results for orders placed or performed in visit on 11/30/22   POCT Influenza A/B MOLECULAR   Result Value Ref Range    POC Molecular Influenza A Ag Negative Negative, Not Reported    POC Molecular Influenza B Ag Negative Negative, Not Reported     Acceptable Yes        Assessment:       1. Acute sinusitis, recurrence not specified, unspecified location          Plan:         Acute sinusitis, recurrence not specified, unspecified location  -     POCT Influenza A/B MOLECULAR  -     azelastine (ASTELIN) 137 mcg (0.1 %) nasal spray; 1 spray (137 mcg total) by Nasal route 2 (two) times daily.  Dispense: 30 mL; Refill: 0  -     fluticasone propionate (FLONASE) 50 mcg/actuation nasal spray; 1 spray (50 mcg total) by Each Nostril route once daily.  Dispense: 15.8 mL; Refill: 0       F/up with ENT           Patient Instructions   - Rest.    - Drink plenty of fluids.     - You can take over-the-counter claritin, zyrtec, allegra, or xyzal as directed. These are antihistamines that can help with runny nose, nasal congestion, sneezing, and helps to dry up post-nasal drip, which usually causes sore throat and cough.              - If you do NOT have high blood pressure, you may use a decongestant form (D)  of this medication (ie. Claritin- D, zyrtec-D, allegra-D) or if you do not take the D form, you can take sudafed (pseudoephedrine) over the counter, which is a decongestant. Do NOT take two decongestant (D) medications at the same time (such as mucinex-D and claritin-D or plain sudafed and claritin D)    - If you DO have Hypertension, anxiety, or palpitations, it is safe to take Coricidin HBP for relief of sinus symptoms.     - You can use Flonase (fluticasone) nasal spray as directed for sinus congestion and postnasal drip. This is a steroid nasal spray that works locally over time to decrease the inflammation in your nose/sinuses and help with allergic symptoms. This is not an quick- relief spray like afrin, but it works well if used daily.  Discontinue if you develop nose bleed  - use nasal saline prior to Flonase.  - Use Ocean Spray Nasal Saline 1-3 puffs each nostril every 2-3 hours then blow out onto tissue. This is to irrigate the nasal passage way to clear the sinus openings. Use until sinus problem resolved.     - you can take plain Mucinex (guaifenesin) 1200 mg twice a day to help loosen mucous.      -warm salt water gargles can help with sore throat     - warm tea with honey can help with cough. Honey is a natural cough suppressant.     - Dextromethorphan (DM) is a cough suppressant over the counter (ie. mucinex DM, robitussin, delsym; dayquil/nyquil has DM as well.)        - Follow up with your PCP or specialty clinic as directed in the next 1-2 weeks if not improved or as needed.  You can call [phone] to schedule an appointment with the appropriate provider.       - Go to the ER if you develop new or worsening symptoms.      - You must understand that you have received an Urgent Care treatment only and that you may be released before all of your medical problems are known or treated.   - You, the patient, will arrange for follow up care as instructed.   - If your condition worsens or fails to improve we recommend that you receive another evaluation at the ER immediately or contact your PCP to discuss your concerns or return here.

## 2022-12-06 NOTE — PATIENT INSTRUCTIONS
It is okay for you to have up to 2,000mg of tylenol (acetaminophen) each day  Avoid raw seafood  Avoid alcohol    Labs, ultrasound, fibroscan, follow up visit   No

## 2023-06-26 ENCOUNTER — PATIENT MESSAGE (OUTPATIENT)
Dept: FAMILY MEDICINE | Facility: CLINIC | Age: 63
End: 2023-06-26
Payer: COMMERCIAL

## 2023-11-22 ENCOUNTER — TELEPHONE (OUTPATIENT)
Dept: FAMILY MEDICINE | Facility: CLINIC | Age: 63
End: 2023-11-22
Payer: COMMERCIAL

## 2023-11-22 NOTE — TELEPHONE ENCOUNTER
----- Message from Jany Ball sent at 11/22/2023  8:52 AM CST -----  Regarding: self [phone]  Type:  Sooner Appointment Request    Patient is requesting a sooner appointment.  Patient declined first available appointment listed as well as another facility and provider .  Patient will not accept being placed on the waitlist and is requesting a message be sent to doctor.    Name of Caller:  self     When is the first available appointment? NA     Symptoms: Annual/ BP concerns    Would the patient rather a call back or a response via [facility]? Call back     Best Call Back Number: [phone]         Pain medicine has been prescribed for you, as needed, and it often causes constipation.  Take docusate sodium (Colace) 100mg 3x daily, while taking narcotic pain medication.   For Constipation :   • Increase your water intake. Drink at least 8 glasses of water daily.  • Try adding fiber to your diet by eating fruits, vegetables and foods that are rich in grains.  • If you do experience constipation, you may take an over-the-counter laxative such as, Senokot, Miralax or  Milk of Magnesia.

## 2023-11-22 NOTE — TELEPHONE ENCOUNTER
Spoke to patient in reference to him getting a sooner appointment for his Annual. Patient informed that [facility] is changing locations for his office and will be located on [facility] at the beginning of the year. Patient request for a New PCP due to travel time. Patient given The Call Center Number to inquire who is accepting new patients at this time.

## 2023-12-01 ENCOUNTER — LAB VISIT (OUTPATIENT)
Dept: LAB | Facility: HOSPITAL | Age: 63
End: 2023-12-01
Payer: COMMERCIAL

## 2023-12-01 ENCOUNTER — OFFICE VISIT (OUTPATIENT)
Dept: FAMILY MEDICINE | Facility: CLINIC | Age: 63
End: 2023-12-01
Payer: COMMERCIAL

## 2023-12-01 VITALS
BODY MASS INDEX: 24.4 KG/M2 | SYSTOLIC BLOOD PRESSURE: 130 MMHG | TEMPERATURE: 98 F | WEIGHT: 151.81 LBS | DIASTOLIC BLOOD PRESSURE: 82 MMHG | HEIGHT: 66 IN | HEART RATE: 80 BPM | OXYGEN SATURATION: 99 %

## 2023-12-01 DIAGNOSIS — Z00.00 ANNUAL PHYSICAL EXAM: ICD-10-CM

## 2023-12-01 DIAGNOSIS — J32.9 CHRONIC SINUSITIS, UNSPECIFIED LOCATION: ICD-10-CM

## 2023-12-01 DIAGNOSIS — Z00.00 ANNUAL PHYSICAL EXAM: Primary | ICD-10-CM

## 2023-12-01 DIAGNOSIS — R03.0 ELEVATED BLOOD PRESSURE READING: ICD-10-CM

## 2023-12-01 LAB
ALBUMIN SERPL BCP-MCNC: 3.9 G/DL (ref 3.5–5.2)
ALP SERPL-CCNC: 61 U/L (ref 55–135)
ALT SERPL W/O P-5'-P-CCNC: 16 U/L (ref 10–44)
ANION GAP SERPL CALC-SCNC: 8 MMOL/L (ref 8–16)
AST SERPL-CCNC: 18 U/L (ref 10–40)
BASOPHILS # BLD AUTO: 0.09 K/UL (ref 0–0.2)
BASOPHILS NFR BLD: 0.9 % (ref 0–1.9)
BILIRUB SERPL-MCNC: 0.4 MG/DL (ref 0.1–1)
BUN SERPL-MCNC: 13 MG/DL (ref 8–23)
CALCIUM SERPL-MCNC: 9.2 MG/DL (ref 8.7–10.5)
CHLORIDE SERPL-SCNC: 102 MMOL/L (ref 95–110)
CO2 SERPL-SCNC: 27 MMOL/L (ref 23–29)
CREAT SERPL-MCNC: 0.8 MG/DL (ref 0.5–1.4)
DIFFERENTIAL METHOD: ABNORMAL
EOSINOPHIL # BLD AUTO: 0.4 K/UL (ref 0–0.5)
EOSINOPHIL NFR BLD: 3.7 % (ref 0–8)
ERYTHROCYTE [DISTWIDTH] IN BLOOD BY AUTOMATED COUNT: 12.8 % (ref 11.5–14.5)
EST. GFR  (NO RACE VARIABLE): >60 ML/MIN/1.73 M^2
ESTIMATED AVG GLUCOSE: 105 MG/DL (ref 68–131)
GLUCOSE SERPL-MCNC: 84 MG/DL (ref 70–110)
HBA1C MFR BLD: 5.3 % (ref 4–5.6)
HCT VFR BLD AUTO: 42.7 % (ref 40–54)
HGB BLD-MCNC: 13.8 G/DL (ref 14–18)
IMM GRANULOCYTES # BLD AUTO: 0.03 K/UL (ref 0–0.04)
IMM GRANULOCYTES NFR BLD AUTO: 0.3 % (ref 0–0.5)
LYMPHOCYTES # BLD AUTO: 1.8 K/UL (ref 1–4.8)
LYMPHOCYTES NFR BLD: 19 % (ref 18–48)
MCH RBC QN AUTO: 31.4 PG (ref 27–31)
MCHC RBC AUTO-ENTMCNC: 32.3 G/DL (ref 32–36)
MCV RBC AUTO: 97 FL (ref 82–98)
MONOCYTES # BLD AUTO: 0.8 K/UL (ref 0.3–1)
MONOCYTES NFR BLD: 8.8 % (ref 4–15)
NEUTROPHILS # BLD AUTO: 6.5 K/UL (ref 1.8–7.7)
NEUTROPHILS NFR BLD: 67.3 % (ref 38–73)
NRBC BLD-RTO: 0 /100 WBC
PLATELET # BLD AUTO: 343 K/UL (ref 150–450)
PMV BLD AUTO: 10.1 FL (ref 9.2–12.9)
POTASSIUM SERPL-SCNC: 4.4 MMOL/L (ref 3.5–5.1)
PROT SERPL-MCNC: 6.9 G/DL (ref 6–8.4)
RBC # BLD AUTO: 4.39 M/UL (ref 4.6–6.2)
SODIUM SERPL-SCNC: 137 MMOL/L (ref 136–145)
WBC # BLD AUTO: 9.58 K/UL (ref 3.9–12.7)

## 2023-12-01 PROCEDURE — 83036 HEMOGLOBIN GLYCOSYLATED A1C: CPT

## 2023-12-01 PROCEDURE — 3079F PR MOST RECENT DIASTOLIC BLOOD PRESSURE 80-89 MM HG: ICD-10-PCS | Mod: CPTII,S$GLB,,

## 2023-12-01 PROCEDURE — 99999 PR PBB SHADOW E&M-EST. PATIENT-LVL IV: ICD-10-PCS | Mod: PBBFAC,,,

## 2023-12-01 PROCEDURE — 3008F PR BODY MASS INDEX (BMI) DOCUMENTED: ICD-10-PCS | Mod: CPTII,S$GLB,,

## 2023-12-01 PROCEDURE — 99396 PR PREVENTIVE VISIT,EST,40-64: ICD-10-PCS | Mod: S$GLB,,,

## 2023-12-01 PROCEDURE — 80053 COMPREHEN METABOLIC PANEL: CPT

## 2023-12-01 PROCEDURE — 3079F DIAST BP 80-89 MM HG: CPT | Mod: CPTII,S$GLB,,

## 2023-12-01 PROCEDURE — 3075F SYST BP GE 130 - 139MM HG: CPT | Mod: CPTII,S$GLB,,

## 2023-12-01 PROCEDURE — 3008F BODY MASS INDEX DOCD: CPT | Mod: CPTII,S$GLB,,

## 2023-12-01 PROCEDURE — 1159F PR MEDICATION LIST DOCUMENTED IN MEDICAL RECORD: ICD-10-PCS | Mod: CPTII,S$GLB,,

## 2023-12-01 PROCEDURE — 3075F PR MOST RECENT SYSTOLIC BLOOD PRESS GE 130-139MM HG: ICD-10-PCS | Mod: CPTII,S$GLB,,

## 2023-12-01 PROCEDURE — 36415 COLL VENOUS BLD VENIPUNCTURE: CPT | Mod: PO

## 2023-12-01 PROCEDURE — 1159F MED LIST DOCD IN RCRD: CPT | Mod: CPTII,S$GLB,,

## 2023-12-01 PROCEDURE — 85025 COMPLETE CBC W/AUTO DIFF WBC: CPT

## 2023-12-01 PROCEDURE — 99999 PR PBB SHADOW E&M-EST. PATIENT-LVL IV: CPT | Mod: PBBFAC,,,

## 2023-12-01 PROCEDURE — 99396 PREV VISIT EST AGE 40-64: CPT | Mod: S$GLB,,,

## 2023-12-01 RX ORDER — MONTELUKAST SODIUM 10 MG/1
10 TABLET ORAL NIGHTLY
Qty: 90 TABLET | Refills: 0 | Status: SHIPPED | OUTPATIENT
Start: 2023-12-01 | End: 2024-03-08
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
## 2024-02-20 NOTE — PROGRESS NOTES
Assessment & Plan  Problem List Items Addressed This Visit        Psychiatric    Alcohol consumption heavy (Chronic)    Overview     Reduced to only on weekends.  12-24 beers per weekend         Current Assessment & Plan     Discussed health risks associated with binge drinking.  Counseled on reducing intake            Cardiac/Vascular    Mixed hyperlipidemia (Chronic)    Current Assessment & Plan     Recheck labs            Other    Tobacco dependence (Chronic)    Current Assessment & Plan     I counseled the patient on smoking cessation, risks associated with smoking, having a quit date, nicotine replacement, medications that can aid in cessation, and having a plan for future cravings.  The patient stated that he isn't currently interested.  Seems precomtemplative           Other Visit Diagnoses     Routine medical exam    -  Primary  -    Discussed healthy diet, regular exercise, necessary labs, age appropriate cancer screening, and routine vaccinations.       Relevant Orders    CBC auto differential    Lipid panel    Encounter for FIT (fecal immunochemical test) screening    -  FitKit was given to patient on 9/5/2019 11:27 AM          Relevant Orders    Fecal Immunochemical Test (iFOBT)            Health Maintenance reviewed, declined all vaccines.    Follow-up: Follow up in about 1 year (around 9/5/2020) for Routine Physical.    ______________________________________________________________________    Chief Complaint  Chief Complaint   Patient presents with    Annual Exam       HPI  Chi Alvarado  is a 59 y.o. male with multiple medical diagnoses as listed in the medical history and problem list that presents for routine physical.  Pt is known to me with his last appointment 08/2018.  He is due for labs and FitKit    No acute complaints.         PAST MEDICAL HISTORY:  Past Medical History:   Diagnosis Date    Alcohol consumption heavy 10/6/2014    C1 cervical fracture     History of hepatitis C, s/p  successful Rx w/ SVR24 (cure) - 7/2019 9/9/2014    S/p Epclusa w/ SVR    Mandible fracture     Mixed hyperlipidemia 8/31/2018       PAST SURGICAL HISTORY:  Past Surgical History:   Procedure Laterality Date    MANDIBLE FRACTURE SURGERY         SOCIAL HISTORY:  Social History     Socioeconomic History    Marital status:      Spouse name: Not on file    Number of children: Not on file    Years of education: Not on file    Highest education level: Not on file   Occupational History     Employer: [employer]   Social Needs    Financial resource strain: Not on file    Food insecurity:     Worry: Not on file     Inability: Not on file    Transportation needs:     Medical: Not on file     Non-medical: Not on file   Tobacco Use    Smoking status: Current Every Day Smoker     Packs/day: 0.50     Years: 41.00     Pack years: 20.50    Smokeless tobacco: Never Used   Substance and Sexual Activity    Alcohol use: Yes     Comment: weekends 6 beers a day    Drug use: No    Sexual activity: Yes     Partners: Female     Comment:    Lifestyle    Physical activity:     Days per week: Not on file     Minutes per session: Not on file    Stress: Not on file   Relationships    Social connections:     Talks on phone: Not on file     Gets together: Not on file     Attends Anabaptist service: Not on file     Active member of club or organization: Not on file     Attends meetings of clubs or organizations: Not on file     Relationship status: Not on file   Other Topics Concern    Not on file   Social History Narrative    Not on file       FAMILY HISTORY:  Family History   Problem Relation Age of Onset    Breast cancer Mother     Lung cancer Mother     Heart disease Father     Heart attack Father        ALLERGIES AND MEDICATIONS: updated and reviewed.  Review of patient's allergies indicates:  No Known Allergies  Current Outpatient Medications   Medication Sig Dispense Refill    meloxicam  "(MOBIC) 15 MG tablet TK 1 T PO QD WITH A MEAL  3    pravastatin (PRAVACHOL) 20 MG tablet TAKE 1 TABLET(20 MG) BY MOUTH EVERY DAY 90 tablet 0     No current facility-administered medications for this visit.          ROS  Review of Systems   Constitutional: Negative for chills, fever and unexpected weight change.   HENT: Negative for congestion, dental problem, ear pain, hearing loss, rhinorrhea, sore throat and trouble swallowing.    Eyes: Negative for discharge, redness and visual disturbance.   Respiratory: Negative for cough, chest tightness, shortness of breath and wheezing.    Cardiovascular: Negative for chest pain, palpitations and leg swelling.   Gastrointestinal: Negative for abdominal pain, constipation, diarrhea, nausea and vomiting.   Endocrine: Negative for polydipsia, polyphagia and polyuria.   Genitourinary: Negative for decreased urine volume, dysuria and hematuria.   Musculoskeletal: Negative for arthralgias and myalgias.   Skin: Negative for color change and rash.   Neurological: Negative for dizziness, weakness, light-headedness and headaches.   Psychiatric/Behavioral: Negative for decreased concentration, dysphoric mood, sleep disturbance and suicidal ideas.           Physical Exam  Vitals:    09/05/19 1105   BP: 120/74   Pulse: 98   Temp: 98.7 °F (37.1 °C)   SpO2: 98%   Weight: 86 kg (189 lb 11.3 oz)   Height: 5' 7" (1.702 m)    Body mass index is 29.71 kg/m².  Weight: 86 kg (189 lb 11.3 oz)   Height: 5' 7" (170.2 cm)   Physical Exam   Constitutional: He is oriented to person, place, and time. He appears well-developed and well-nourished. No distress.   HENT:   Head: Normocephalic and atraumatic.   Right Ear: Tympanic membrane, external ear and ear canal normal.   Left Ear: Tympanic membrane, external ear and ear canal normal.   Nose: Mucosal edema and rhinorrhea (clear) present. No septal deviation. Right sinus exhibits no maxillary sinus tenderness and no frontal sinus tenderness. Left sinus " exhibits no maxillary sinus tenderness and no frontal sinus tenderness.   Mouth/Throat: Uvula is midline, oropharynx is clear and moist and mucous membranes are normal. No tonsillar exudate.   Upper plate noted   Eyes: Pupils are equal, round, and reactive to light. Conjunctivae and EOM are normal. Right eye exhibits no discharge. Left eye exhibits no discharge. No scleral icterus.   Neck: Neck supple. No JVD present. No spinous process tenderness and no muscular tenderness present. Carotid bruit is not present. No tracheal deviation present. No thyroid mass and no thyromegaly present.   Cardiovascular: Normal rate, regular rhythm, normal heart sounds and intact distal pulses. Exam reveals no S3, no S4 and no friction rub.   No murmur heard.  Pulmonary/Chest: Effort normal and breath sounds normal. He has no wheezes. He has no rhonchi. He has no rales.   Abdominal: Soft. Bowel sounds are normal. He exhibits no distension. There is no tenderness. There is no rebound, no guarding and no CVA tenderness.   Musculoskeletal: Normal range of motion. He exhibits no edema or tenderness.   Lymphadenopathy:        Head (right side): No submental and no submandibular adenopathy present.        Head (left side): No submental and no submandibular adenopathy present.     He has no cervical adenopathy.   Neurological: He is alert and oriented to person, place, and time. Coordination normal.   Motor grossly intact.  Sensation grossly normal.  Symmetric facial movements palate elevated symmetrically tongue midline    Skin: Skin is warm and dry. Capillary refill takes less than 2 seconds. No rash noted. No cyanosis. Nails show no clubbing.   Psychiatric: He has a normal mood and affect. His speech is normal and behavior is normal. Thought content normal. Cognition and memory are normal.         Health Maintenance       Date Due Completion Date    Pneumococcal Vaccine (Medium Risk) (1 of 1 - PPSV23) 07/05/1980 ---    Shingles Vaccine  (1 of 2) 07/05/2011 ---    Fecal Occult Blood Test (FOBT)/FitKit 08/31/2019 8/31/2018    Influenza Vaccine (1) 09/01/2019 ---    Lipid Panel 08/29/2023 8/29/2018    TETANUS VACCINE 08/28/2028 8/28/2018             [No, patient denies ideation or behavior] : No, patient denies ideation or behavior

## 2024-03-07 DIAGNOSIS — J32.9 CHRONIC SINUSITIS, UNSPECIFIED LOCATION: ICD-10-CM

## 2024-03-08 RX ORDER — MONTELUKAST SODIUM 10 MG/1
10 TABLET ORAL NIGHTLY
Qty: 90 TABLET | Refills: 0 | Status: SHIPPED | OUTPATIENT
Start: 2024-03-08 | End: 2024-05-07 | Stop reason: SDUPTHER

## 2024-04-09 ENCOUNTER — TELEPHONE (OUTPATIENT)
Dept: FAMILY MEDICINE | Facility: CLINIC | Age: 64
End: 2024-04-09
Payer: COMMERCIAL

## 2024-04-09 NOTE — TELEPHONE ENCOUNTER
----- Message from Aly Joya sent at 4/9/2024 10:15 AM CDT -----  Contact: pt  Type: Requesting to speak with nurse        Who Called: PT  Regarding: would like a sooner appt. Pt has high bp  .Would the patient rather a call back or a response via [facility]? Call back  Best Call Back Number:  [phone]  Additional Information:

## 2024-04-12 ENCOUNTER — TELEPHONE (OUTPATIENT)
Dept: FAMILY MEDICINE | Facility: CLINIC | Age: 64
End: 2024-04-12
Payer: COMMERCIAL

## 2024-04-25 ENCOUNTER — OFFICE VISIT (OUTPATIENT)
Dept: FAMILY MEDICINE | Facility: CLINIC | Age: 64
End: 2024-04-25
Payer: COMMERCIAL

## 2024-04-25 VITALS
WEIGHT: 156.31 LBS | HEART RATE: 88 BPM | OXYGEN SATURATION: 98 % | DIASTOLIC BLOOD PRESSURE: 86 MMHG | BODY MASS INDEX: 25.12 KG/M2 | TEMPERATURE: 98 F | HEIGHT: 66 IN | SYSTOLIC BLOOD PRESSURE: 130 MMHG

## 2024-04-25 DIAGNOSIS — Z12.5 PROSTATE CANCER SCREENING: ICD-10-CM

## 2024-04-25 DIAGNOSIS — B35.4 TINEA CORPORIS: ICD-10-CM

## 2024-04-25 DIAGNOSIS — Z86.19 HISTORY OF HEPATITIS C: ICD-10-CM

## 2024-04-25 DIAGNOSIS — Z12.11 COLON CANCER SCREENING: ICD-10-CM

## 2024-04-25 DIAGNOSIS — Z23 NEED FOR SHINGLES VACCINE: ICD-10-CM

## 2024-04-25 DIAGNOSIS — Z23 PNEUMOCOCCAL VACCINATION GIVEN: ICD-10-CM

## 2024-04-25 DIAGNOSIS — Z87.891 HISTORY OF TOBACCO USE: ICD-10-CM

## 2024-04-25 DIAGNOSIS — Z00.00 ENCOUNTER FOR MEDICAL EXAMINATION TO ESTABLISH CARE: Primary | ICD-10-CM

## 2024-04-25 DIAGNOSIS — I10 ESSENTIAL HYPERTENSION: ICD-10-CM

## 2024-04-25 DIAGNOSIS — E78.5 HYPERLIPIDEMIA, UNSPECIFIED HYPERLIPIDEMIA TYPE: ICD-10-CM

## 2024-04-25 PROCEDURE — 99999 PR PBB SHADOW E&M-EST. PATIENT-LVL III: CPT | Mod: PBBFAC,,,

## 2024-04-25 PROCEDURE — 3079F DIAST BP 80-89 MM HG: CPT | Mod: CPTII,S$GLB,, | Performed by: FAMILY MEDICINE

## 2024-04-25 PROCEDURE — 1159F MED LIST DOCD IN RCRD: CPT | Mod: CPTII,S$GLB,, | Performed by: FAMILY MEDICINE

## 2024-04-25 PROCEDURE — 90472 IMMUNIZATION ADMIN EACH ADD: CPT | Mod: S$GLB,,, | Performed by: FAMILY MEDICINE

## 2024-04-25 PROCEDURE — 90677 PCV20 VACCINE IM: CPT | Mod: S$GLB,,, | Performed by: FAMILY MEDICINE

## 2024-04-25 PROCEDURE — 3008F BODY MASS INDEX DOCD: CPT | Mod: CPTII,S$GLB,, | Performed by: FAMILY MEDICINE

## 2024-04-25 PROCEDURE — 1160F RVW MEDS BY RX/DR IN RCRD: CPT | Mod: CPTII,S$GLB,, | Performed by: FAMILY MEDICINE

## 2024-04-25 PROCEDURE — 99214 OFFICE O/P EST MOD 30 MIN: CPT | Mod: 25,S$GLB,, | Performed by: FAMILY MEDICINE

## 2024-04-25 PROCEDURE — 90750 HZV VACC RECOMBINANT IM: CPT | Mod: S$GLB,,, | Performed by: FAMILY MEDICINE

## 2024-04-25 PROCEDURE — 90471 IMMUNIZATION ADMIN: CPT | Mod: S$GLB,,, | Performed by: FAMILY MEDICINE

## 2024-04-25 PROCEDURE — 3075F SYST BP GE 130 - 139MM HG: CPT | Mod: CPTII,S$GLB,, | Performed by: FAMILY MEDICINE

## 2024-04-25 RX ORDER — LISINOPRIL 10 MG/1
10 TABLET ORAL DAILY
Qty: 30 TABLET | Refills: 11 | Status: SHIPPED | OUTPATIENT
Start: 2024-04-25 | End: 2025-04-25

## 2024-04-25 RX ORDER — FLUCONAZOLE 200 MG/1
200 TABLET ORAL WEEKLY
Qty: 4 TABLET | Refills: 0 | Status: SHIPPED | OUTPATIENT
Start: 2024-04-25 | End: 2024-05-17

## 2024-04-25 NOTE — PROGRESS NOTES
Assessment & Plan:    Encounter for medical examination to establish care    Essential hypertension  -     lisinopriL 10 MG tablet; Take 1 tablet (10 mg total) by mouth once daily.  Dispense: 30 tablet; Refill: 11  -     CBC Auto Differential; Future; Expected date: 04/25/2024  -     Comprehensive Metabolic Panel; Future; Expected date: 04/25/2024  -     Hemoglobin A1C; Future; Expected date: 04/25/2024  -     Lipid Panel; Future; Expected date: 04/25/2024  -     Urinalysis; Future; Expected date: 04/25/2024    Start lisinopril 10 mg daily.  Fasting labs to be scheduled.    Tinea corporis  -     fluconazole (DIFLUCAN) 200 MG Tab; Take 1 tablet (200 mg total) by mouth once a week. for 4 doses  Dispense: 4 tablet; Refill: 0    Start antifungal treatment.    Hyperlipidemia, unspecified hyperlipidemia type  -     Lipid Panel; Future; Expected date: 04/25/2024    Prescribed pravastatin but no longer taking it for an unspecified reason.    History of hepatitis C  -     HEPATITIS C RNA, QUANTITATIVE, PCR; Future; Expected date: 04/25/2024    Reports history of treated hepatitis C - repeat RNA to ensure clearance.    Prostate cancer screening  -     PSA, Screening; Future; Expected date: 04/25/2024    Colon cancer screening  -     Fecal Immunochemical Test (iFOBT); Future; Expected date: 04/25/2024    Need for shingles vaccine  -     varicella-zoster GE-AS01B (PF)(SHINGRIX) 50 mcg/0.5 mL KIT    RTC in 2 months for a nurse visit for last shingles vaccine.    Pneumococcal vaccination given  -     (VFC) pneumococcocal 20 vaccine (PREVNAR 20) syringe (preferred for >/= 2 months)    History of tobacco use   Patient quit smoking a month ago - commended him on this healthy lifestyle change.       Health maintenance reviewed & addressed above.    Encouraged RSV vaccine.     Follow-up: Follow up in about 6 months (around 10/25/2024).  ______________________________________________________________________    Chief Complaint  Chief  Complaint   Patient presents with    Establish Care     Elevated blood pressure       HPI  Chi Alvarado Jr. is a 63 y.o. male with medical diagnoses as listed in the medical history and problem list that presents to the office to establish care. Patient reports elevated blood pressure readings a few weeks ago. He states that he felt dizzy at work and his BP was 181/100 around that time. Patient quit smoking about a month ago. He also c/o rash on the abdomen and left arm since quitting smoking.     Health Maintenance         Date Due Completion Date    RSV Vaccine (Age 60+ and Pregnant patients) (1 - 1-dose 60+ series) Never done ---    Colorectal Cancer Screening 09/28/2021 9/28/2020    Influenza Vaccine (1) 09/01/2023 9/21/2020    COVID-19 Vaccine (3 - 2023-24 season) 09/01/2023 4/16/2021    Shingles Vaccine (2 of 2) 06/20/2024 4/25/2024    LDCT Lung Screen 08/13/2024 8/13/2023    Lipid Panel 09/08/2025 9/8/2020    Hemoglobin A1c (Diabetic Prevention Screening) 12/01/2026 12/1/2023    TETANUS VACCINE 08/13/2033 8/13/2023              PAST MEDICAL HISTORY:  Past Medical History:   Diagnosis Date    Alcohol consumption heavy 10/6/2014    C1 cervical fracture     History of hepatitis C, s/p successful Rx w/ SVR24 (cure) - 7/2019 9/9/2014    S/p Epclusa w/ SVR    Mandible fracture     Mixed hyperlipidemia 8/31/2018       PAST SURGICAL HISTORY:  Past Surgical History:   Procedure Laterality Date    MANDIBLE FRACTURE SURGERY         SOCIAL HISTORY:  Social History     Socioeconomic History    Marital status:    Occupational History     Employer: [employer]   Tobacco Use    Smoking status: Former     Current packs/day: 0.50     Average packs/day: 0.5 packs/day for 41.0 years (20.5 ttl pk-yrs)     Types: Cigarettes    Smokeless tobacco: Never   Substance and Sexual Activity    Alcohol use: Yes     Comment: weekends 6 beers a day    Drug use: No    Sexual activity: Yes     Partners: Female      "Comment:        FAMILY HISTORY:  Family History   Problem Relation Name Age of Onset    Breast cancer Mother      Lung cancer Mother      Heart disease Father      Heart attack Father         ALLERGIES AND MEDICATIONS: updated and reviewed.  Review of patient's allergies indicates:  No Known Allergies  Current Outpatient Medications   Medication Sig Dispense Refill    montelukast (SINGULAIR) 10 mg tablet TAKE 1 TABLET(10 MG) BY MOUTH EVERY EVENING 90 tablet 0    fluconazole (DIFLUCAN) 200 MG Tab Take 1 tablet (200 mg total) by mouth once a week. for 4 doses 4 tablet 0    lisinopriL 10 MG tablet Take 1 tablet (10 mg total) by mouth once daily. 30 tablet 11     No current facility-administered medications for this visit.         ROS  Review of Systems   Constitutional:  Negative for activity change.   Skin:  Positive for color change and rash.   Neurological:  Positive for dizziness.           Physical Exam  Vitals:    04/25/24 0752   BP: 130/86   Pulse: 88   Temp: 98.1 °F (36.7 °C)   TempSrc: Oral   SpO2: 98%   Weight: 70.9 kg (156 lb 4.9 oz)   Height: 5' 6" (1.676 m)    Body mass index is 25.23 kg/m².  Weight: 70.9 kg (156 lb 4.9 oz)   Height: 5' 6" (167.6 cm)   Physical Exam  Constitutional:       General: He is not in acute distress.     Appearance: Normal appearance.   HENT:      Head: Normocephalic and atraumatic.   Neck:      Thyroid: No thyromegaly.      Vascular: No carotid bruit.   Cardiovascular:      Rate and Rhythm: Normal rate and regular rhythm.      Pulses: Normal pulses.      Heart sounds: Normal heart sounds.   Pulmonary:      Effort: Pulmonary effort is normal. No respiratory distress.      Breath sounds: Normal breath sounds.   Musculoskeletal:      Cervical back: Neck supple.      Right lower leg: No edema.      Left lower leg: No edema.   Lymphadenopathy:      Cervical: No cervical adenopathy.   Skin:     General: Skin is warm and dry.      Findings: Rash (circular, erythematous patches on " the abdomen and medial aspect of the left upper extremity; one patch has an erythematous border with central clearing) present.   Neurological:      General: No focal deficit present.      Mental Status: He is alert and oriented to person, place, and time.   Psychiatric:         Mood and Affect: Mood normal.         Behavior: Behavior normal.         Thought Content: Thought content normal.

## 2024-04-29 ENCOUNTER — LAB VISIT (OUTPATIENT)
Dept: LAB | Facility: HOSPITAL | Age: 64
End: 2024-04-29
Attending: FAMILY MEDICINE
Payer: COMMERCIAL

## 2024-04-29 DIAGNOSIS — I10 ESSENTIAL HYPERTENSION: ICD-10-CM

## 2024-04-29 DIAGNOSIS — Z12.5 PROSTATE CANCER SCREENING: ICD-10-CM

## 2024-04-29 DIAGNOSIS — E78.5 HYPERLIPIDEMIA, UNSPECIFIED HYPERLIPIDEMIA TYPE: ICD-10-CM

## 2024-04-29 DIAGNOSIS — Z86.19 HISTORY OF HEPATITIS C: ICD-10-CM

## 2024-04-29 LAB
ALBUMIN SERPL BCP-MCNC: 4 G/DL (ref 3.5–5.2)
ALP SERPL-CCNC: 54 U/L (ref 55–135)
ALT SERPL W/O P-5'-P-CCNC: 15 U/L (ref 10–44)
ANION GAP SERPL CALC-SCNC: 8 MMOL/L (ref 8–16)
AST SERPL-CCNC: 19 U/L (ref 10–40)
BASOPHILS # BLD AUTO: 0.06 K/UL (ref 0–0.2)
BASOPHILS NFR BLD: 0.9 % (ref 0–1.9)
BILIRUB SERPL-MCNC: 0.5 MG/DL (ref 0.1–1)
BUN SERPL-MCNC: 13 MG/DL (ref 8–23)
CALCIUM SERPL-MCNC: 9.4 MG/DL (ref 8.7–10.5)
CHLORIDE SERPL-SCNC: 103 MMOL/L (ref 95–110)
CHOLEST SERPL-MCNC: 152 MG/DL (ref 120–199)
CHOLEST/HDLC SERPL: 2.6 {RATIO} (ref 2–5)
CO2 SERPL-SCNC: 24 MMOL/L (ref 23–29)
COMPLEXED PSA SERPL-MCNC: 2.5 NG/ML (ref 0–4)
CREAT SERPL-MCNC: 0.8 MG/DL (ref 0.5–1.4)
DIFFERENTIAL METHOD BLD: ABNORMAL
EOSINOPHIL # BLD AUTO: 0.2 K/UL (ref 0–0.5)
EOSINOPHIL NFR BLD: 2.4 % (ref 0–8)
ERYTHROCYTE [DISTWIDTH] IN BLOOD BY AUTOMATED COUNT: 12.2 % (ref 11.5–14.5)
EST. GFR  (NO RACE VARIABLE): >60 ML/MIN/1.73 M^2
ESTIMATED AVG GLUCOSE: 114 MG/DL (ref 68–131)
GLUCOSE SERPL-MCNC: 95 MG/DL (ref 70–110)
HBA1C MFR BLD: 5.6 % (ref 4–5.6)
HCT VFR BLD AUTO: 43.1 % (ref 40–54)
HDLC SERPL-MCNC: 59 MG/DL (ref 40–75)
HDLC SERPL: 38.8 % (ref 20–50)
HGB BLD-MCNC: 14.4 G/DL (ref 14–18)
IMM GRANULOCYTES # BLD AUTO: 0.03 K/UL (ref 0–0.04)
IMM GRANULOCYTES NFR BLD AUTO: 0.4 % (ref 0–0.5)
LDLC SERPL CALC-MCNC: 84.4 MG/DL (ref 63–159)
LYMPHOCYTES # BLD AUTO: 0.9 K/UL (ref 1–4.8)
LYMPHOCYTES NFR BLD: 13 % (ref 18–48)
MCH RBC QN AUTO: 32.1 PG (ref 27–31)
MCHC RBC AUTO-ENTMCNC: 33.4 G/DL (ref 32–36)
MCV RBC AUTO: 96 FL (ref 82–98)
MONOCYTES # BLD AUTO: 0.6 K/UL (ref 0.3–1)
MONOCYTES NFR BLD: 9.3 % (ref 4–15)
NEUTROPHILS # BLD AUTO: 5 K/UL (ref 1.8–7.7)
NEUTROPHILS NFR BLD: 74 % (ref 38–73)
NONHDLC SERPL-MCNC: 93 MG/DL
NRBC BLD-RTO: 0 /100 WBC
PLATELET # BLD AUTO: 341 K/UL (ref 150–450)
PMV BLD AUTO: 9.5 FL (ref 9.2–12.9)
POTASSIUM SERPL-SCNC: 4.5 MMOL/L (ref 3.5–5.1)
PROT SERPL-MCNC: 7.3 G/DL (ref 6–8.4)
RBC # BLD AUTO: 4.48 M/UL (ref 4.6–6.2)
SODIUM SERPL-SCNC: 135 MMOL/L (ref 136–145)
TRIGL SERPL-MCNC: 43 MG/DL (ref 30–150)
WBC # BLD AUTO: 6.75 K/UL (ref 3.9–12.7)

## 2024-04-29 PROCEDURE — 80061 LIPID PANEL: CPT | Performed by: FAMILY MEDICINE

## 2024-04-29 PROCEDURE — 84153 ASSAY OF PSA TOTAL: CPT | Performed by: FAMILY MEDICINE

## 2024-04-29 PROCEDURE — 87522 HEPATITIS C REVRS TRNSCRPJ: CPT | Performed by: FAMILY MEDICINE

## 2024-04-29 PROCEDURE — 36415 COLL VENOUS BLD VENIPUNCTURE: CPT | Mod: PO | Performed by: FAMILY MEDICINE

## 2024-04-29 PROCEDURE — 80053 COMPREHEN METABOLIC PANEL: CPT | Performed by: FAMILY MEDICINE

## 2024-04-29 PROCEDURE — 85025 COMPLETE CBC W/AUTO DIFF WBC: CPT | Performed by: FAMILY MEDICINE

## 2024-04-29 PROCEDURE — 83036 HEMOGLOBIN GLYCOSYLATED A1C: CPT | Performed by: FAMILY MEDICINE

## 2024-04-30 ENCOUNTER — LAB VISIT (OUTPATIENT)
Dept: LAB | Facility: HOSPITAL | Age: 64
End: 2024-04-30
Attending: FAMILY MEDICINE
Payer: COMMERCIAL

## 2024-04-30 ENCOUNTER — PATIENT MESSAGE (OUTPATIENT)
Dept: FAMILY MEDICINE | Facility: CLINIC | Age: 64
End: 2024-04-30
Payer: COMMERCIAL

## 2024-04-30 DIAGNOSIS — Z12.11 COLON CANCER SCREENING: ICD-10-CM

## 2024-04-30 DIAGNOSIS — E78.5 HYPERLIPIDEMIA, UNSPECIFIED HYPERLIPIDEMIA TYPE: ICD-10-CM

## 2024-04-30 DIAGNOSIS — I10 ESSENTIAL HYPERTENSION: Primary | ICD-10-CM

## 2024-04-30 LAB
HCV RNA SERPL QL NAA+PROBE: NOT DETECTED
HCV RNA SPEC NAA+PROBE-ACNC: NOT DETECTED IU/ML
HEMOCCULT STL QL IA: NEGATIVE

## 2024-04-30 PROCEDURE — 82274 ASSAY TEST FOR BLOOD FECAL: CPT | Performed by: FAMILY MEDICINE

## 2024-04-30 NOTE — TELEPHONE ENCOUNTER
Patient was informed of result(s) via [facility]. Please contact them to schedule fasting labs before his next appointment in Oct.

## 2024-05-07 DIAGNOSIS — J32.9 CHRONIC SINUSITIS, UNSPECIFIED LOCATION: ICD-10-CM

## 2024-05-07 RX ORDER — MONTELUKAST SODIUM 10 MG/1
10 TABLET ORAL NIGHTLY
Qty: 90 TABLET | Refills: 0 | Status: SHIPPED | OUTPATIENT
Start: 2024-05-07

## 2024-05-10 ENCOUNTER — OFFICE VISIT (OUTPATIENT)
Dept: URGENT CARE | Facility: CLINIC | Age: 64
End: 2024-05-10
Payer: COMMERCIAL

## 2024-05-10 VITALS
BODY MASS INDEX: 24.84 KG/M2 | SYSTOLIC BLOOD PRESSURE: 139 MMHG | RESPIRATION RATE: 17 BRPM | OXYGEN SATURATION: 96 % | WEIGHT: 154.56 LBS | TEMPERATURE: 98 F | HEART RATE: 88 BPM | DIASTOLIC BLOOD PRESSURE: 93 MMHG | HEIGHT: 66 IN

## 2024-05-10 DIAGNOSIS — L53.1 ERYTHEMA ANNULARE: Primary | ICD-10-CM

## 2024-05-10 DIAGNOSIS — L29.9 PRURITUS: ICD-10-CM

## 2024-05-10 PROCEDURE — 99213 OFFICE O/P EST LOW 20 MIN: CPT | Mod: S$GLB,,, | Performed by: NURSE PRACTITIONER

## 2024-05-10 RX ORDER — TRIAMCINOLONE ACETONIDE 1 MG/G
CREAM TOPICAL 3 TIMES DAILY
Qty: 80 G | Refills: 0 | Status: SHIPPED | OUTPATIENT
Start: 2024-05-10 | End: 2024-05-20

## 2024-05-10 RX ORDER — HYDROXYZINE HYDROCHLORIDE 25 MG/1
25 TABLET, FILM COATED ORAL 3 TIMES DAILY PRN
Qty: 30 TABLET | Refills: 0 | Status: SHIPPED | OUTPATIENT
Start: 2024-05-10 | End: 2024-05-20

## 2024-05-10 NOTE — PROGRESS NOTES
"Subjective:      Patient ID: Chi Alvarado Jr. is a 63 y.o. male.    Vitals:  height is 5' 6" (1.676 m) and weight is 70.1 kg (154 lb 8.7 oz). His oral temperature is 98 °F (36.7 °C). His blood pressure is 139/93 (abnormal) and his pulse is 88. His respiration is 17 and oxygen saturation is 96%.     Chief Complaint: Rash (Entered by patient)    Pt states he has been having a itchy rash over his abdomen for 3 months. Pt has been using topical antifungal cream and po Diflucan without improvement. Pt reports spreading to upper arms and thighs.     Rash  This is a new problem. The current episode started more than 1 month ago. The problem is unchanged. The affected locations include the torso and chest. The rash is characterized by itchiness, dryness, redness and scaling. He was exposed to nothing. Past treatments include topical steroids and antibiotic cream. The treatment provided no relief.       Skin:  Positive for rash.      Objective:     Physical Exam   Constitutional: He is oriented to person, place, and time. He appears well-developed. He is cooperative. awake  HENT:   Head: Normocephalic and atraumatic.   Ears:   Right Ear: Hearing and external ear normal.   Left Ear: Hearing and external ear normal.   Nose: Nose normal. No congestion.   Mouth/Throat: Uvula is midline, oropharynx is clear and moist and mucous membranes are normal. No posterior oropharyngeal erythema.   Eyes: Conjunctivae, EOM and lids are normal. Pupils are equal, round, and reactive to light. Extraocular movement intact   Neck: Trachea normal and phonation normal. Neck supple. No thyromegaly present.   Cardiovascular: Normal rate, regular rhythm, S1 normal, S2 normal, normal heart sounds and normal pulses.   Pulmonary/Chest: Effort normal and breath sounds normal. No respiratory distress. He has no decreased breath sounds.   Abdominal: Bowel sounds are normal. Soft. flat abdomen   Musculoskeletal: Normal range of motion.         General: " Normal range of motion.      Right lower leg: No edema.      Left lower leg: No edema.   Lymphadenopathy:     He has no cervical adenopathy.   Neurological: no focal deficit. He is alert and oriented to person, place, and time.   Skin: Skin is warm, dry, intact and rash (Prurutic annular rash to trunk with papular raised lesions to arms and thighs with central scale. Negatie for vessicles or dermatone pattern. Annular shaped lesions with central clearing). Capillary refill takes less than 2 seconds.   Psychiatric: His speech is normal and behavior is normal. Mood, judgment and thought content normal.   Nursing note and vitals reviewed.        Assessment:     1. Erythema annulare    2. Pruritus      Differential Granuloma Annulare  Referral to Dermatology  Plan:       Erythema annulare  -     Ambulatory referral/consult to Dermatology  -     triamcinolone acetonide 0.1% (KENALOG) 0.1 % cream; Apply topically 3 (three) times daily. Apply to lesions on trunk and legs for 10 days  Dispense: 80 g; Refill: 0    Pruritus  -     Ambulatory referral/consult to Dermatology  -     hydrOXYzine HCL (ATARAX) 25 MG tablet; Take 1 tablet (25 mg total) by mouth 3 (three) times daily as needed for Itching.  Dispense: 30 tablet; Refill: 0        Patient Instructions   Discharge Instructions  Pt to follow up with Dermatology for possible biopsy  Referral submitted  Avoid all scented products  Start topical steroid cream as prescribed  What lifestyle changes are needed?   Avoid foods that may make your skin itch.  Take extra care when you take a bath or shower.  Use lukewarm water and limit bath or shower time to 5 to 10 minutes.  Use only mild and unscented soaps and bath products.  Apply products that moisturize your skin.  Stay away from things that bother your skin. These may include household , detergents, aftershave lotions, gasoline, and other liquids.  Avoid clothes that fit too tight or are made of wool or synthetic  fabrics.  Do not scratch or rub the skin. Try to be gentle and pat or tap the skin instead.  Keep your fingernails trimmed and short to avoid damage to the skin.  Use a cool compress on the skin. Dip a cloth in cold water and apply it directly to your skin. This may help it to not swell and itch.  Avoid heat and humidity for long periods.    1) See orders for this visit as documented in the electronic medical record.  2) Symptomatic therapy suggested: use acetaminophen/ibuprofen every 6-8 hours prn pain or fever, push fluids.   3) Call or return to clinic prn if these symptoms worsen or fail to improve as anticipated.    Discussed results/diagnosis/plan with patient in clinic.  We had shared decision making for patient's treatment. Patient verbalized understanding and in agreement with current treatment plan.     Patient was instructed to return for re-evaluation with urgent care or PCP for continued outpatient workup and management if symptoms do not improve/worsening symptoms. Strict ED versus clinic precautions given in depth.    Discharge and follow-up instructions given verbally/printed with the patient who expressed understanding. The instructions and results are also available on [facility].      - You must understand that you have received an Urgent Care treatment only and that you may be released before all of your medical problems are known or treated.   - You, the patient, will arrange for follow up care as instructed.   - Follow up with your PCP or specialty clinic as directed in the next 1-2 weeks if not improved or as needed.  You can call [phone] to schedule an appointment with the appropriate provider.   - If your condition worsens or fails to improve we recommend that you receive another evaluation at the ER immediately or contact your PCP to discuss your concerns or return here.        IRENE Hawk

## 2024-05-10 NOTE — PATIENT INSTRUCTIONS
Discharge Instructions  Pt to follow up with Dermatology for possible biopsy  Referral submitted  Avoid all scented products  Start topical steroid cream as prescribed  What lifestyle changes are needed?   Avoid foods that may make your skin itch.  Take extra care when you take a bath or shower.  Use lukewarm water and limit bath or shower time to 5 to 10 minutes.  Use only mild and unscented soaps and bath products.  Apply products that moisturize your skin.  Stay away from things that bother your skin. These may include household , detergents, aftershave lotions, gasoline, and other liquids.  Avoid clothes that fit too tight or are made of wool or synthetic fabrics.  Do not scratch or rub the skin. Try to be gentle and pat or tap the skin instead.  Keep your fingernails trimmed and short to avoid damage to the skin.  Use a cool compress on the skin. Dip a cloth in cold water and apply it directly to your skin. This may help it to not swell and itch.  Avoid heat and humidity for long periods.    1) See orders for this visit as documented in the electronic medical record.  2) Symptomatic therapy suggested: use acetaminophen/ibuprofen every 6-8 hours prn pain or fever, push fluids.   3) Call or return to clinic prn if these symptoms worsen or fail to improve as anticipated.    Discussed results/diagnosis/plan with patient in clinic.  We had shared decision making for patient's treatment. Patient verbalized understanding and in agreement with current treatment plan.     Patient was instructed to return for re-evaluation with urgent care or PCP for continued outpatient workup and management if symptoms do not improve/worsening symptoms. Strict ED versus clinic precautions given in depth.    Discharge and follow-up instructions given verbally/printed with the patient who expressed understanding. The instructions and results are also available on [facility] [facility].      - You must understand that you have received an  Urgent Care treatment only and that you may be released before all of your medical problems are known or treated.   - You, the patient, will arrange for follow up care as instructed.   - Follow up with your PCP or specialty clinic as directed in the next 1-2 weeks if not improved or as needed.  You can call [phone] to schedule an appointment with the appropriate provider.   - If your condition worsens or fails to improve we recommend that you receive another evaluation at the ER immediately or contact your PCP to discuss your concerns or return here.        IRENE Hawk

## 2024-06-25 ENCOUNTER — CLINICAL SUPPORT (OUTPATIENT)
Dept: FAMILY MEDICINE | Facility: CLINIC | Age: 64
End: 2024-06-25
Payer: COMMERCIAL

## 2024-06-25 DIAGNOSIS — Z23 NEED FOR SHINGLES VACCINE: Primary | ICD-10-CM

## 2024-06-25 PROCEDURE — 90750 HZV VACC RECOMBINANT IM: CPT | Mod: S$GLB,,, | Performed by: FAMILY MEDICINE

## 2024-06-25 PROCEDURE — 99999 PR PBB SHADOW E&M-EST. PATIENT-LVL I: CPT | Mod: PBBFAC,,,

## 2024-06-25 PROCEDURE — 90471 IMMUNIZATION ADMIN: CPT | Mod: S$GLB,,, | Performed by: FAMILY MEDICINE

## 2024-08-06 DIAGNOSIS — J32.9 CHRONIC SINUSITIS, UNSPECIFIED LOCATION: ICD-10-CM

## 2024-08-06 RX ORDER — MONTELUKAST SODIUM 10 MG/1
10 TABLET ORAL NIGHTLY
Qty: 90 TABLET | Refills: 0 | OUTPATIENT
Start: 2024-08-06

## 2024-10-25 ENCOUNTER — OFFICE VISIT (OUTPATIENT)
Dept: FAMILY MEDICINE | Facility: CLINIC | Age: 64
End: 2024-10-25
Payer: COMMERCIAL

## 2024-10-25 VITALS
DIASTOLIC BLOOD PRESSURE: 82 MMHG | SYSTOLIC BLOOD PRESSURE: 114 MMHG | OXYGEN SATURATION: 99 % | WEIGHT: 149.56 LBS | HEIGHT: 66 IN | BODY MASS INDEX: 24.04 KG/M2 | TEMPERATURE: 98 F | HEART RATE: 76 BPM

## 2024-10-25 DIAGNOSIS — J30.9 ALLERGIC SINUSITIS: ICD-10-CM

## 2024-10-25 DIAGNOSIS — Z12.5 PROSTATE CANCER SCREENING: ICD-10-CM

## 2024-10-25 DIAGNOSIS — Z72.0 TOBACCO USE: ICD-10-CM

## 2024-10-25 DIAGNOSIS — I10 ESSENTIAL HYPERTENSION: Primary | ICD-10-CM

## 2024-10-25 PROBLEM — E78.2 MIXED HYPERLIPIDEMIA: Chronic | Status: RESOLVED | Noted: 2018-08-31 | Resolved: 2024-10-25

## 2024-10-25 PROCEDURE — 99999 PR PBB SHADOW E&M-EST. PATIENT-LVL IV: CPT | Mod: PBBFAC,,, | Performed by: FAMILY MEDICINE

## 2024-10-25 NOTE — PROGRESS NOTES
Assessment & Plan:    Essential hypertension  -     CBC Auto Differential; Future; Expected date: 10/25/2024  -     Comprehensive Metabolic Panel; Future; Expected date: 10/25/2024  -     Hemoglobin A1C; Future; Expected date: 10/25/2024  -     Lipid Panel; Future; Expected date: 10/25/2024    Controlled. Continue current therapy.   Labs due before next visit in April.    Allergic sinusitis  May use pseudoephedrine occasionally but avoid daily use due to possible increase of BP.    Prostate cancer screening  -     PSA, Screening; Future; Expected date: 10/25/2024    Tobacco use  Advised cessation.     Encouraged RSV vaccine.  Declines influenza vaccine.     Follow-up: Follow up in about 6 months (around 4/25/2025).  ______________________________________________________________________    Chief Complaint  Chief Complaint   Patient presents with    Health Maintenance       HPI  Chi JESÚS Alvarado Jr. is a 64 y.o. male with medical diagnoses as listed in the medical history and problem list that presents to the office to follow up on his chronic conditions. He has been experiencing sinus pressure and congestion related to allergies. Taking an antihistamine from behind the pharmacy counter. Unfortunately, he has started smoking again after quitting for 4 months. He gets a lot of secondhand exposure to smoke from his wife.     Health Maintenance         Date Due Completion Date    RSV Vaccine (Age 60+ and Pregnant patients) (1 - Risk 60-74 years 1-dose series) Never done ---    Influenza Vaccine (1) 09/01/2024 9/21/2020    COVID-19 Vaccine (3 - 2024-25 season) 09/01/2024 4/16/2021    Colorectal Cancer Screening 04/30/2025 4/30/2024    Lipid Panel 04/29/2029 4/29/2024    TETANUS VACCINE 08/13/2033 8/13/2023              PAST MEDICAL HISTORY:  Past Medical History:   Diagnosis Date    Alcohol consumption heavy 10/6/2014    C1 cervical fracture     History of hepatitis C, s/p successful Rx w/ SVR24 (cure) - 7/2019 9/9/2014  "   S/p Epclusa w/ SVR    Mandible fracture     Mixed hyperlipidemia 8/31/2018       PAST SURGICAL HISTORY:  Past Surgical History:   Procedure Laterality Date    MANDIBLE FRACTURE SURGERY         SOCIAL HISTORY:  Social History     Socioeconomic History    Marital status:    Occupational History     Employer: [employer]   Tobacco Use    Smoking status: Former     Current packs/day: 0.50     Average packs/day: 0.5 packs/day for 41.0 years (20.5 ttl pk-yrs)     Types: Cigarettes    Smokeless tobacco: Never   Substance and Sexual Activity    Alcohol use: Yes     Comment: weekends 6 beers a day    Drug use: No    Sexual activity: Yes     Partners: Female     Comment:        FAMILY HISTORY:  Family History   Problem Relation Name Age of Onset    Breast cancer Mother      Lung cancer Mother      Heart disease Father      Heart attack Father         ALLERGIES AND MEDICATIONS: updated and reviewed.  Review of patient's allergies indicates:  No Known Allergies  Current Outpatient Medications   Medication Sig Dispense Refill    lisinopriL 10 MG tablet Take 1 tablet (10 mg total) by mouth once daily. 30 tablet 11     No current facility-administered medications for this visit.         ROS  Review of Systems   HENT:  Positive for congestion and sinus pressure.    Allergic/Immunologic: Positive for environmental allergies.           Physical Exam  Vitals:    10/25/24 0814   BP: 114/82   BP Location: Left arm   Patient Position: Sitting   Pulse: 76   Temp: 97.6 °F (36.4 °C)   TempSrc: Oral   SpO2: 99%   Weight: 67.8 kg (149 lb 9.3 oz)   Height: 5' 6" (1.676 m)    Body mass index is 24.14 kg/m².  Weight: 67.8 kg (149 lb 9.3 oz)   Height: 5' 6" (167.6 cm)   Physical Exam  Constitutional:       General: He is not in acute distress.     Appearance: Normal appearance.   HENT:      Head: Normocephalic and atraumatic.   Neck:      Thyroid: No thyromegaly.      Vascular: No carotid bruit.   Cardiovascular: "      Rate and Rhythm: Normal rate and regular rhythm.      Pulses: Normal pulses.      Heart sounds: Normal heart sounds.   Pulmonary:      Effort: Pulmonary effort is normal. No respiratory distress.      Breath sounds: Normal breath sounds.   Musculoskeletal:      Cervical back: Neck supple.      Right lower leg: No edema.      Left lower leg: No edema.   Lymphadenopathy:      Cervical: No cervical adenopathy.   Skin:     General: Skin is warm and dry.      Findings: No rash.   Neurological:      General: No focal deficit present.      Mental Status: He is alert and oriented to person, place, and time.   Psychiatric:         Mood and Affect: Mood normal.         Behavior: Behavior normal.         Thought Content: Thought content normal.

## 2025-01-29 DIAGNOSIS — I10 ESSENTIAL HYPERTENSION: ICD-10-CM

## 2025-01-29 RX ORDER — LISINOPRIL 10 MG/1
10 TABLET ORAL
Qty: 90 TABLET | Refills: 0 | Status: SHIPPED | OUTPATIENT
Start: 2025-01-29

## 2025-01-29 NOTE — TELEPHONE ENCOUNTER
Care Due:                  Date            Visit Type   Department     Provider  --------------------------------------------------------------------------------                                [facility]                              PRIMARY      MED/ INTERNAL  Last Visit: 10-      CARE ([facility])   MED/ PEDS      Marleny Maharaj                              [facility]                              PRIMARY      MED/ INTERNAL  Next Visit: 05-      CARE ([facility])   MED/ PEDS      Marleny Maharaj                                                            Last  Test          Frequency    Reason                     Performed    Due Date  --------------------------------------------------------------------------------    CMP.........  12 months..  lisinopriL...............  04- 04-    [facility] Embedded Care Due Messages. Reference number: [id number].   1/29/2025 8:03:37 AM CST

## 2025-01-30 NOTE — TELEPHONE ENCOUNTER
Refill Decision Note   Chi Jenny  is requesting a refill authorization.  Brief Assessment and Rationale for Refill:  Approve     Medication Therapy Plan:  FOVS, FLOS      Comments:     Note composed:10:35 PM 01/29/2025

## 2025-03-17 ENCOUNTER — TELEPHONE (OUTPATIENT)
Dept: FAMILY MEDICINE | Facility: CLINIC | Age: 65
End: 2025-03-17
Payer: COMMERCIAL

## 2025-04-28 ENCOUNTER — LAB VISIT (OUTPATIENT)
Dept: LAB | Facility: HOSPITAL | Age: 65
End: 2025-04-28
Attending: FAMILY MEDICINE
Payer: COMMERCIAL

## 2025-04-28 DIAGNOSIS — Z12.5 PROSTATE CANCER SCREENING: ICD-10-CM

## 2025-04-28 DIAGNOSIS — I10 ESSENTIAL HYPERTENSION: ICD-10-CM

## 2025-04-28 LAB
ABSOLUTE EOSINOPHIL (OHS): 0.37 K/UL
ABSOLUTE MONOCYTE (OHS): 0.78 K/UL (ref 0.3–1)
ABSOLUTE NEUTROPHIL COUNT (OHS): 8.23 K/UL (ref 1.8–7.7)
ALBUMIN SERPL BCP-MCNC: 4 G/DL (ref 3.5–5.2)
ALP SERPL-CCNC: 51 UNIT/L (ref 40–150)
ALT SERPL W/O P-5'-P-CCNC: 17 UNIT/L (ref 10–44)
ANION GAP (OHS): 8 MMOL/L (ref 8–16)
AST SERPL-CCNC: 22 UNIT/L (ref 11–45)
BASOPHILS # BLD AUTO: 0.06 K/UL
BASOPHILS NFR BLD AUTO: 0.5 %
BILIRUB SERPL-MCNC: 0.3 MG/DL (ref 0.1–1)
BUN SERPL-MCNC: 13 MG/DL (ref 8–23)
CALCIUM SERPL-MCNC: 9.3 MG/DL (ref 8.7–10.5)
CHLORIDE SERPL-SCNC: 104 MMOL/L (ref 95–110)
CHOLEST SERPL-MCNC: 161 MG/DL (ref 120–199)
CHOLEST/HDLC SERPL: 3.4 {RATIO} (ref 2–5)
CO2 SERPL-SCNC: 26 MMOL/L (ref 23–29)
CREAT SERPL-MCNC: 0.8 MG/DL (ref 0.5–1.4)
EAG (OHS): 111 MG/DL (ref 68–131)
ERYTHROCYTE [DISTWIDTH] IN BLOOD BY AUTOMATED COUNT: 13.3 % (ref 11.5–14.5)
GFR SERPLBLD CREATININE-BSD FMLA CKD-EPI: >60 ML/MIN/1.73/M2
GLUCOSE SERPL-MCNC: 81 MG/DL (ref 70–110)
HBA1C MFR BLD: 5.5 % (ref 4–5.6)
HCT VFR BLD AUTO: 44 % (ref 40–54)
HDLC SERPL-MCNC: 48 MG/DL (ref 40–75)
HDLC SERPL: 29.8 % (ref 20–50)
HGB BLD-MCNC: 14.8 GM/DL (ref 14–18)
IMM GRANULOCYTES # BLD AUTO: 0.03 K/UL (ref 0–0.04)
IMM GRANULOCYTES NFR BLD AUTO: 0.3 % (ref 0–0.5)
LDLC SERPL CALC-MCNC: 95.4 MG/DL (ref 63–159)
LYMPHOCYTES # BLD AUTO: 1.69 K/UL (ref 1–4.8)
MCH RBC QN AUTO: 31.9 PG (ref 27–31)
MCHC RBC AUTO-ENTMCNC: 33.6 G/DL (ref 32–36)
MCV RBC AUTO: 95 FL (ref 82–98)
NONHDLC SERPL-MCNC: 113 MG/DL
NUCLEATED RBC (/100WBC) (OHS): 0 /100 WBC
PLATELET # BLD AUTO: 366 K/UL (ref 150–450)
PMV BLD AUTO: 9.6 FL (ref 9.2–12.9)
POTASSIUM SERPL-SCNC: 4.3 MMOL/L (ref 3.5–5.1)
PROT SERPL-MCNC: 7.4 GM/DL (ref 6–8.4)
PSA SERPL-MCNC: 1.53 NG/ML
RBC # BLD AUTO: 4.64 M/UL (ref 4.6–6.2)
RELATIVE EOSINOPHIL (OHS): 3.3 %
RELATIVE LYMPHOCYTE (OHS): 15.1 % (ref 18–48)
RELATIVE MONOCYTE (OHS): 7 % (ref 4–15)
RELATIVE NEUTROPHIL (OHS): 73.8 % (ref 38–73)
SODIUM SERPL-SCNC: 138 MMOL/L (ref 136–145)
TRIGL SERPL-MCNC: 88 MG/DL (ref 30–150)
WBC # BLD AUTO: 11.16 K/UL (ref 3.9–12.7)

## 2025-04-28 PROCEDURE — 85025 COMPLETE CBC W/AUTO DIFF WBC: CPT

## 2025-04-28 PROCEDURE — 80061 LIPID PANEL: CPT

## 2025-04-28 PROCEDURE — 83036 HEMOGLOBIN GLYCOSYLATED A1C: CPT

## 2025-04-28 PROCEDURE — 80053 COMPREHEN METABOLIC PANEL: CPT

## 2025-04-28 PROCEDURE — 36415 COLL VENOUS BLD VENIPUNCTURE: CPT | Mod: PO

## 2025-04-28 PROCEDURE — 84153 ASSAY OF PSA TOTAL: CPT

## 2025-04-29 ENCOUNTER — PATIENT MESSAGE (OUTPATIENT)
Dept: FAMILY MEDICINE | Facility: CLINIC | Age: 65
End: 2025-04-29
Payer: COMMERCIAL

## 2025-05-26 DIAGNOSIS — I10 ESSENTIAL HYPERTENSION: ICD-10-CM

## 2025-05-26 NOTE — TELEPHONE ENCOUNTER
No care due was identified.  [facility] Embedded Care Due Messages. Reference number: [id number].   5/26/2025 2:07:35 PM CDT

## 2025-05-27 RX ORDER — LISINOPRIL 10 MG/1
10 TABLET ORAL DAILY
Qty: 90 TABLET | Refills: 1 | Status: SHIPPED | OUTPATIENT
Start: 2025-05-27

## 2025-05-27 NOTE — TELEPHONE ENCOUNTER
Refill Decision Note   Chi Jenny  is requesting a refill authorization.  Brief Assessment and Rationale for Refill:  Approve     Medication Therapy Plan:         Comments:     Note composed:9:22 AM 05/27/2025

## 2025-08-29 ENCOUNTER — PATIENT MESSAGE (OUTPATIENT)
Dept: ENDOSCOPY | Facility: HOSPITAL | Age: 65
End: 2025-08-29
Payer: COMMERCIAL